# Patient Record
Sex: FEMALE | Race: WHITE | Employment: OTHER | URBAN - METROPOLITAN AREA
[De-identification: names, ages, dates, MRNs, and addresses within clinical notes are randomized per-mention and may not be internally consistent; named-entity substitution may affect disease eponyms.]

---

## 2017-01-23 DIAGNOSIS — I10 BENIGN ESSENTIAL HYPERTENSION: Primary | ICD-10-CM

## 2017-01-24 RX ORDER — HYDROCHLOROTHIAZIDE 25 MG/1
25 TABLET ORAL DAILY
Qty: 90 TABLET | Refills: 1 | Status: SHIPPED | OUTPATIENT
Start: 2017-01-24 | End: 2017-07-06

## 2017-01-24 NOTE — TELEPHONE ENCOUNTER
Medication refilled per [facility] RN protocol.   Mariluz Brooke RN   [facility]

## 2017-07-06 DIAGNOSIS — I10 BENIGN ESSENTIAL HYPERTENSION: ICD-10-CM

## 2017-07-06 RX ORDER — HYDROCHLOROTHIAZIDE 25 MG/1
25 TABLET ORAL DAILY
Qty: 30 TABLET | Refills: 0 | Status: SHIPPED | OUTPATIENT
Start: 2017-07-06 | End: 2017-07-06

## 2017-07-06 RX ORDER — HYDROCHLOROTHIAZIDE 25 MG/1
TABLET ORAL
Qty: 30 TABLET | Refills: 0 | Status: SHIPPED | OUTPATIENT
Start: 2017-07-06 | End: 2017-08-03

## 2017-07-06 NOTE — TELEPHONE ENCOUNTER
Medication is being filled for 1 time refill only due to:  Patient needs to be seen because it has been more than one year since last visit.   1 month supply sent.     Mariluz Brooke RN   [facility]

## 2017-07-19 ENCOUNTER — DOCUMENTATION ONLY (OUTPATIENT)
Dept: LAB | Facility: CLINIC | Age: 82
End: 2017-07-19

## 2017-07-19 DIAGNOSIS — E78.5 HYPERLIPIDEMIA LDL GOAL <130: ICD-10-CM

## 2017-07-19 DIAGNOSIS — I10 BENIGN ESSENTIAL HYPERTENSION: Primary | ICD-10-CM

## 2017-07-19 NOTE — PROGRESS NOTES
This patient has a lab only appointment on 7/25/2017 but does not have future orders. Please review, associate diagnosis and sign pending lab orders for the upcoming appointment.  She has an appointment with Emery Kuhn on 8/3/2017.    Thank you,    [facility]

## 2017-07-25 DIAGNOSIS — E78.5 HYPERLIPIDEMIA LDL GOAL <130: ICD-10-CM

## 2017-07-25 DIAGNOSIS — I10 BENIGN ESSENTIAL HYPERTENSION: ICD-10-CM

## 2017-07-25 LAB
ANION GAP SERPL CALCULATED.3IONS-SCNC: 7 MMOL/L (ref 3–14)
BUN SERPL-MCNC: 21 MG/DL (ref 7–30)
CALCIUM SERPL-MCNC: 8.8 MG/DL (ref 8.5–10.1)
CHLORIDE SERPL-SCNC: 103 MMOL/L (ref 94–109)
CHOLEST SERPL-MCNC: 225 MG/DL
CO2 SERPL-SCNC: 29 MMOL/L (ref 20–32)
CREAT SERPL-MCNC: 0.84 MG/DL (ref 0.52–1.04)
GFR SERPL CREATININE-BSD FRML MDRD: 65 ML/MIN/1.7M2
GLUCOSE SERPL-MCNC: 128 MG/DL (ref 70–99)
HDLC SERPL-MCNC: 55 MG/DL
LDLC SERPL CALC-MCNC: 143 MG/DL
NONHDLC SERPL-MCNC: 170 MG/DL
POTASSIUM SERPL-SCNC: 3.6 MMOL/L (ref 3.4–5.3)
SODIUM SERPL-SCNC: 139 MMOL/L (ref 133–144)
TRIGL SERPL-MCNC: 136 MG/DL

## 2017-07-25 PROCEDURE — 80061 LIPID PANEL: CPT | Performed by: PHYSICIAN ASSISTANT

## 2017-07-25 PROCEDURE — 80048 BASIC METABOLIC PNL TOTAL CA: CPT | Performed by: PHYSICIAN ASSISTANT

## 2017-07-25 PROCEDURE — 36415 COLL VENOUS BLD VENIPUNCTURE: CPT | Performed by: PHYSICIAN ASSISTANT

## 2017-08-02 NOTE — PROGRESS NOTES
SUBJECTIVE:                                                    Flores Cuello is a 82 year old female who presents to clinic today for the following health issues:    Hyperlipidemia Follow-Up      Rate your low fat/cholesterol diet?: not monitoring fat    Taking statin?  No    Other lipid medications/supplements?:  none    Hypertension Follow-up      Outpatient blood pressures are not being checked.    Low Salt Diet: not monitoring salt          Amount of exercise or physical activity: walking, gardening     Problems taking medications regularly: No    Medication side effects: none  Diet: regular (no restrictions)      Problem list and histories reviewed & adjusted, as indicated.  Additional history: as documented    Patient Active Problem List   Diagnosis     Hypertension goal BP (blood pressure) < 140/90     Impaired fasting glucose     Advance directive discussed with patient     Hyperlipidemia LDL goal <130     High cholesterol     Benign essential hypertension     Past Surgical History:   Procedure Laterality Date      SECTION        SECTION         Social History   Substance Use Topics     Smoking status: Never Smoker     Smokeless tobacco: Never Used     Alcohol use No     No family history on file.      Current Outpatient Prescriptions   Medication Sig Dispense Refill     hydrochlorothiazide (HYDRODIURIL) 25 MG tablet TAKE 1 TABLET BY MOUTH EVERY DAY. 90 tablet 1     fluticasone (FLONASE) 50 MCG/ACT nasal spray Spray 2 sprays into both nostrils daily as needed for rhinitis or allergies 16 g 9     aspirin 81 MG chewable tablet Take 1 tablet (81 mg) by mouth daily 108 tablet 3     VITAMIN E daily       Cholecalciferol (VITAMIN D-3 PO) Take by mouth daily       COD LIVER OIL PO Take  by mouth.       Multiple Vitamin (DAILY MULTIVITAMIN PO) Take  by mouth daily.       [DISCONTINUED] hydrochlorothiazide (HYDRODIURIL) 25 MG tablet TAKE 1 TABLET BY MOUTH EVERY DAY. DUE FOR APPOINTMENT 30  "tablet 0     albuterol (PROAIR HFA, PROVENTIL HFA, VENTOLIN HFA) 108 (90 BASE) MCG/ACT inhaler Inhale 2 puffs into the lungs every 6 hours as needed for shortness of breath / dyspnea or wheezing (Patient not taking: Reported on 8/3/2017) 1 Inhaler 1     Allergies   Allergen Reactions     Antihistamine [Altaryl]      Palpitations, \"funny in the head/foggy\"     Lisinopril      Patient states \"felt shaky\"     Simvastatin      \"felt shaky\"     Amoxicillin Rash     Penicillins Rash     Recent Labs   Lab Test  07/25/17   0920  06/28/16   1020   07/08/15   0932  07/02/14   0923   09/21/12   0916  08/22/12   0755   06/27/12   0823   A1C   --    --    --    --   6.0   --    --   5.8   --    --    LDL  143*  117*   --   128   --    < >  119  137*   --   141*   HDL  55  55   --   47*   --    < >  48*  48*   --   53   TRIG  136  154*   --   160*   --    < >  157*  146   --   115   ALT   --    --    --    --    --    --   19  21   --   15   CR  0.84  0.77   < >  0.83   --    < >  0.72  0.76   < >  0.64   GFRESTIMATED  65  72   < >  66   --    < >  79  74   < >  90   GFRESTBLACK  79  87   < >  80   --    < >  >90  89   < >  >90   POTASSIUM  3.6  3.6   < >  3.9   --    < >  3.7  3.7   < >  4.0   TSH   --   2.34   --    --    --    --    --    --    --   2.07    < > = values in this interval not displayed.      BP Readings from Last 3 Encounters:   08/03/17 138/76   06/28/16 136/80   04/25/16 130/80    Wt Readings from Last 3 Encounters:   08/03/17 194 lb (88 kg)   06/28/16 191 lb (86.6 kg)   04/25/16 197 lb (89.4 kg)                  Labs reviewed in EPIC          ROS:  Constitutional, HEENT, cardiovascular, pulmonary, gi and gu systems are negative, except as otherwise noted.      OBJECTIVE:   /76  Pulse 109  Wt 194 lb (88 kg)  SpO2 96%  Breastfeeding? No  BMI 45.92 kg/m2  Body mass index is 45.92 kg/(m^2).  GENERAL: healthy, alert and no distress  Heart: S1 and S2 normal, no murmurs, clicks, gallops or rubs. Regular " rate and rhythm.  Chest: Clear; no wheezes or rales.  The abdomen is soft without tenderness, guarding, mass, rebound or organomegaly. Bowel sounds are normal.        Diagnostic Test Results:  Results for orders placed or performed in visit on 07/25/17   Basic metabolic panel  (Ca, Cl, CO2, Creat, Gluc, K, Na, BUN)   Result Value Ref Range    Sodium 139 133 - 144 mmol/L    Potassium 3.6 3.4 - 5.3 mmol/L    Chloride 103 94 - 109 mmol/L    Carbon Dioxide 29 20 - 32 mmol/L    Anion Gap 7 3 - 14 mmol/L    Glucose 128 (H) 70 - 99 mg/dL    Urea Nitrogen 21 7 - 30 mg/dL    Creatinine 0.84 0.52 - 1.04 mg/dL    GFR Estimate 65 >60 mL/min/1.7m2    GFR Estimate If Black 79 >60 mL/min/1.7m2    Calcium 8.8 8.5 - 10.1 mg/dL   **Lipid panel reflex to direct LDL FUTURE anytime   Result Value Ref Range    Cholesterol 225 (H) <200 mg/dL    Triglycerides 136 <150 mg/dL    HDL Cholesterol 55 >49 mg/dL    LDL Cholesterol Calculated 143 (H) <100 mg/dL    Non HDL Cholesterol 170 (H) <130 mg/dL       ASSESSMENT/PLAN:         ICD-10-CM    1. Benign essential hypertension I10 hydrochlorothiazide (HYDRODIURIL) 25 MG tablet     Basic metabolic panel   2. High cholesterol E78.00    3. Impaired fasting glucose R73.01 Hemoglobin A1c   4. Asymptomatic postmenopausal status Z78.0 DEXA HIP/PELVIS/SPINE - Future   Recheck labs in 3 months. Work on Healthy diet and exercise. Getting heart rate elevated for 30 mins most days of week.  meds refilled.  If stable recheck blood pressure in 6 months.     Emery Kuhn PA-C  [facility]

## 2017-08-03 ENCOUNTER — OFFICE VISIT (OUTPATIENT)
Dept: FAMILY MEDICINE | Facility: CLINIC | Age: 82
End: 2017-08-03
Payer: COMMERCIAL

## 2017-08-03 VITALS
SYSTOLIC BLOOD PRESSURE: 138 MMHG | BODY MASS INDEX: 45.92 KG/M2 | DIASTOLIC BLOOD PRESSURE: 76 MMHG | HEART RATE: 109 BPM | OXYGEN SATURATION: 96 % | WEIGHT: 194 LBS

## 2017-08-03 DIAGNOSIS — I10 BENIGN ESSENTIAL HYPERTENSION: Primary | ICD-10-CM

## 2017-08-03 DIAGNOSIS — Z78.0 ASYMPTOMATIC POSTMENOPAUSAL STATUS: ICD-10-CM

## 2017-08-03 DIAGNOSIS — R73.01 IMPAIRED FASTING GLUCOSE: ICD-10-CM

## 2017-08-03 DIAGNOSIS — E78.00 HIGH CHOLESTEROL: ICD-10-CM

## 2017-08-03 PROCEDURE — 99213 OFFICE O/P EST LOW 20 MIN: CPT | Performed by: PHYSICIAN ASSISTANT

## 2017-08-03 RX ORDER — HYDROCHLOROTHIAZIDE 25 MG/1
TABLET ORAL
Qty: 90 TABLET | Refills: 1 | Status: SHIPPED | OUTPATIENT
Start: 2017-08-03 | End: 2018-03-14

## 2017-08-03 NOTE — MR AVS SNAPSHOT
After Visit Summary   8/3/2017    Flores Cuello    MRN: [id number]           Patient Information     Date Of Birth          12/10/1934        Visit Information        Provider Department      8/3/2017 9:20 AM Emery Kuhn PA-C [facility]        Today's Diagnoses     Benign essential hypertension    -  1    High cholesterol        Impaired fasting glucose        Asymptomatic postmenopausal status           Follow-ups after your visit        Future tests that were ordered for you today     Open Future Orders        Priority Expected Expires Ordered    DEXA HIP/PELVIS/SPINE - Future Routine  8/2/2018 8/3/2017    Basic metabolic panel Routine  8/3/2018 8/3/2017    Hemoglobin A1c Routine  2/2/2018 8/3/2017            Who to contact     If you have questions or need follow up information about today's clinic visit or your schedule please contact [facility] directly at [phone].  Normal or non-critical lab and imaging results will be communicated to you by MyChart, letter or phone within 4 business days after the clinic has received the results. If you do not hear from us within 7 days, please contact the clinic through [employer] or phone. If you have a critical or abnormal lab result, we will notify you by phone as soon as possible.  Submit refill requests through [employer] or call your pharmacy and they will forward the refill request to us. Please allow 3 business days for your refill to be completed.          Additional Information About Your Visit        MyChart Information     [employer] gives you secure access to your electronic health record. If you see a primary care provider, you can also send messages to your care team and make appointments. If you have questions, please call your primary care clinic.  If you do not have a primary care provider, please call [phone] and they will assist you.        Care EveryWhere ID     This is your Care EveryWhere ID.  This could be used by other organizations to access your [facility] medical records  [id number]        Your Vitals Were     Pulse Pulse Oximetry Breastfeeding? BMI (Body Mass Index)          109 96% No 45.92 kg/m2         Blood Pressure from Last 3 Encounters:   08/03/17 138/76   06/28/16 136/80   04/25/16 130/80    Weight from Last 3 Encounters:   08/03/17 194 lb (88 kg)   06/28/16 191 lb (86.6 kg)   04/25/16 197 lb (89.4 kg)                 Today's Medication Changes          These changes are accurate as of: 8/3/17  9:29 AM.  If you have any questions, ask your nurse or doctor.               These medicines have changed or have updated prescriptions.        Dose/Directions    hydrochlorothiazide 25 MG tablet   Commonly known as:  HYDRODIURIL   This may have changed:  See the new instructions.   Used for:  Benign essential hypertension   Changed by:  Emery Kuhn PA-C        TAKE 1 TABLET BY MOUTH EVERY DAY.   Quantity:  90 tablet   Refills:  1            Where to get your medicines      These medications were sent to [facility] [address] [place] [address] AT [facility] & [address] [address]     Phone:  [phone]     hydrochlorothiazide 25 MG tablet                Primary Care Provider Office Phone # Fax #    Emery Kuhn PA-C [phone]       [facility] [address] [facility] [address]        Equal Access to Services     VARGHESE GARCIA AH: Hadii aad ku hadasho Soomaali, waaxda luqadaha, qaybta kaalmada adeegyada, waxay nu diaz. So [facility] [phone].    ATENCIÓN: Si habla español, tiene a white disposición servicios gratuitos de asistencia lingüística. Llame al [phone].    We comply with applicable federal civil rights laws and [place] laws. We do not discriminate on the basis of race, color, national origin, age, disability sex, sexual orientation or gender  identity.            Thank you!     Thank you for choosing [facility]  for your care. Our goal is always to provide you with excellent care. Hearing back from our patients is one way we can continue to improve our services. Please take a few minutes to complete the written survey that you may receive in the mail after your visit with us. Thank you!             Your Updated Medication List - Protect others around you: Learn how to safely use, store and throw away your medicines at www.disposemymeds.org.          This list is accurate as of: 8/3/17  9:29 AM.  Always use your most recent med list.                   Brand Name Dispense Instructions for use Diagnosis    albuterol 108 (90 BASE) MCG/ACT Inhaler    PROAIR HFA/PROVENTIL HFA/VENTOLIN HFA    1 Inhaler    Inhale 2 puffs into the lungs every 6 hours as needed for shortness of breath / dyspnea or wheezing    Wheezing       aspirin 81 MG chewable tablet     108 tablet    Take 1 tablet (81 mg) by mouth daily    Hypertension goal BP (blood pressure) < 140/90       COD LIVER OIL PO      Take  by mouth.        DAILY MULTIVITAMIN PO      Take  by mouth daily.        fluticasone 50 MCG/ACT spray    FLONASE    16 g    Spray 2 sprays into both nostrils daily as needed for rhinitis or allergies    Seasonal allergic rhinitis       hydrochlorothiazide 25 MG tablet    HYDRODIURIL    90 tablet    TAKE 1 TABLET BY MOUTH EVERY DAY.    Benign essential hypertension       VITAMIN D-3 PO      Take by mouth daily        VITAMIN E      daily

## 2017-08-03 NOTE — NURSING NOTE
"Chief Complaint   Patient presents with     Hypertension     Lipids       Initial /76  Pulse 109  Wt 194 lb (88 kg)  SpO2 96%  Breastfeeding? No  BMI 45.92 kg/m2 Estimated body mass index is 45.92 kg/(m^2) as calculated from the following:    Height as of 4/25/16: 4' 6.5\" (1.384 m).    Weight as of this encounter: 194 lb (88 kg).  Medication Reconciliation: complete  Nidhi Schuster CMA    "

## 2018-02-24 DIAGNOSIS — I10 BENIGN ESSENTIAL HYPERTENSION: ICD-10-CM

## 2018-02-26 DIAGNOSIS — I10 BENIGN ESSENTIAL HYPERTENSION: ICD-10-CM

## 2018-02-26 RX ORDER — HYDROCHLOROTHIAZIDE 25 MG/1
25 TABLET ORAL DAILY
Qty: 30 TABLET | Refills: 5 | Status: SHIPPED | OUTPATIENT
Start: 2018-02-26 | End: 2018-02-26

## 2018-02-26 NOTE — LETTER
March 1, 2018    Flores Cuello  [address] [place] [address] [place] [address]    Dear Flores,       We recently received a refill request for hydrochlorothiazide (HYDRODIURIL) 25 MG tablet.  We have refilled this for a one time 30 day supply only because you are due for a:    Blood Pressure office visit      Please call at your earliest convenience so that there will not be a delay with your future refills.          Thank you,   Your [facility] Team/ks  [phone]

## 2018-02-27 ENCOUNTER — TELEPHONE (OUTPATIENT)
Dept: FAMILY MEDICINE | Facility: CLINIC | Age: 83
End: 2018-02-27

## 2018-02-27 NOTE — TELEPHONE ENCOUNTER
Patient states she is supposed to come in for labs 3 months after her last visit with you.  Please enter orders and call her to schedule an appointment after 4:00 pm.    Thank you.

## 2018-02-28 DIAGNOSIS — I10 BENIGN ESSENTIAL HYPERTENSION: ICD-10-CM

## 2018-02-28 DIAGNOSIS — R73.01 IMPAIRED FASTING GLUCOSE: ICD-10-CM

## 2018-02-28 LAB
ANION GAP SERPL CALCULATED.3IONS-SCNC: 9 MMOL/L (ref 3–14)
BUN SERPL-MCNC: 16 MG/DL (ref 7–30)
CALCIUM SERPL-MCNC: 8.7 MG/DL (ref 8.5–10.1)
CHLORIDE SERPL-SCNC: 101 MMOL/L (ref 94–109)
CO2 SERPL-SCNC: 27 MMOL/L (ref 20–32)
CREAT SERPL-MCNC: 0.83 MG/DL (ref 0.52–1.04)
GFR SERPL CREATININE-BSD FRML MDRD: 66 ML/MIN/1.7M2
GLUCOSE SERPL-MCNC: 121 MG/DL (ref 70–99)
HBA1C MFR BLD: 6.3 % (ref 4.3–6)
POTASSIUM SERPL-SCNC: 3.7 MMOL/L (ref 3.4–5.3)
SODIUM SERPL-SCNC: 137 MMOL/L (ref 133–144)

## 2018-02-28 PROCEDURE — 80048 BASIC METABOLIC PNL TOTAL CA: CPT | Performed by: PHYSICIAN ASSISTANT

## 2018-02-28 PROCEDURE — 83036 HEMOGLOBIN GLYCOSYLATED A1C: CPT | Performed by: PHYSICIAN ASSISTANT

## 2018-02-28 PROCEDURE — 36415 COLL VENOUS BLD VENIPUNCTURE: CPT | Performed by: PHYSICIAN ASSISTANT

## 2018-02-28 RX ORDER — HYDROCHLOROTHIAZIDE 25 MG/1
TABLET ORAL
Qty: 30 TABLET | Refills: 0 | Status: SHIPPED | OUTPATIENT
Start: 2018-02-28 | End: 2018-03-14

## 2018-03-09 NOTE — PROGRESS NOTES
SUBJECTIVE:                                                    Flores Cuello is a 83 year old female who presents to clinic today for the following health issues:    Hypertension Follow-up      Outpatient blood pressures are not being checked.    Low Salt Diet: low salt      Amount of exercise or physical activity: stretching in morning, walking    Problems taking medications regularly: No    Medication side effects: none    Diet: low salt, no soda  No chest pain or short of breath, no headache, dizziness.   defers taking cholesterol medications.    Problem list and histories reviewed & adjusted, as indicated.  Additional history: as documented    Patient Active Problem List   Diagnosis     Hypertension goal BP (blood pressure) < 140/90     Impaired fasting glucose     Advance directive discussed with patient     Hyperlipidemia LDL goal <130     High cholesterol     Benign essential hypertension     Past Surgical History:   Procedure Laterality Date      SECTION        SECTION         Social History   Substance Use Topics     Smoking status: Never Smoker     Smokeless tobacco: Never Used     Alcohol use No     History reviewed. No pertinent family history.      Current Outpatient Prescriptions   Medication Sig Dispense Refill     metoprolol succinate (TOPROL-XL) 25 MG 24 hr tablet Take 1 tablet (25 mg) by mouth daily 30 tablet 1     hydrochlorothiazide (HYDRODIURIL) 25 MG tablet TAKE 1 TABLET(25 MG) BY MOUTH DAILY 30 tablet 0     fluticasone (FLONASE) 50 MCG/ACT nasal spray Spray 2 sprays into both nostrils daily as needed for rhinitis or allergies 16 g 9     albuterol (PROAIR HFA, PROVENTIL HFA, VENTOLIN HFA) 108 (90 BASE) MCG/ACT inhaler Inhale 2 puffs into the lungs every 6 hours as needed for shortness of breath / dyspnea or wheezing 1 Inhaler 1     aspirin 81 MG chewable tablet Take 1 tablet (81 mg) by mouth daily 108 tablet 3     VITAMIN E daily       Cholecalciferol (VITAMIN D-3  "PO) Take by mouth daily       COD LIVER OIL PO Take  by mouth.       Multiple Vitamin (DAILY MULTIVITAMIN PO) Take  by mouth daily.       [DISCONTINUED] hydrochlorothiazide (HYDRODIURIL) 25 MG tablet TAKE 1 TABLET(25 MG) BY MOUTH DAILY 30 tablet 0     [DISCONTINUED] hydrochlorothiazide (HYDRODIURIL) 25 MG tablet TAKE 1 TABLET BY MOUTH EVERY DAY. 90 tablet 1     Allergies   Allergen Reactions     Antihistamine [Altaryl]      Palpitations, \"funny in the head/foggy\"     Lisinopril      Patient states \"felt shaky\"     Simvastatin      \"felt shaky\"     Amoxicillin Rash     Penicillins Rash     BP Readings from Last 3 Encounters:   03/14/18 171/85   08/03/17 138/76   06/28/16 136/80    Wt Readings from Last 3 Encounters:   03/14/18 185 lb (83.9 kg)   08/03/17 194 lb (88 kg)   06/28/16 191 lb (86.6 kg)                  Labs reviewed in EPIC    ROS:  Constitutional, HEENT, cardiovascular, pulmonary, gi and gu systems are negative, except as otherwise noted.    OBJECTIVE:     /85  Pulse 89  Temp 97.5  F (36.4  C) (Oral)  Resp 18  Ht 4' 6.5\" (1.384 m)  Wt 185 lb (83.9 kg)  SpO2 96%  BMI 43.79 kg/m2  Body mass index is 43.79 kg/(m^2).  GENERAL: healthy, alert and no distress  RESP: lungs clear to auscultation - no rales, rhonchi or wheezes  CV: regular rate and rhythm, normal S1 S2, no S3 or S4, no murmur, click or rub, no peripheral edema and peripheral pulses strong  MS: no gross musculoskeletal defects noted, no edema    Diagnostic Test Results:  none     ASSESSMENT/PLAN:         ICD-10-CM    1. Benign essential hypertension I10 metoprolol succinate (TOPROL-XL) 25 MG 24 hr tablet     hydrochlorothiazide (HYDRODIURIL) 25 MG tablet   add metoprolol. warning signs discussed. side effects discussed  Recheck blood pressure in 2wks.   Work on Healthy diet and exercise. Getting heart rate elevated for 30 mins most days of week.     Emery Kuhn PA-C  [facility]    "

## 2018-03-14 ENCOUNTER — OFFICE VISIT (OUTPATIENT)
Dept: FAMILY MEDICINE | Facility: CLINIC | Age: 83
End: 2018-03-14
Payer: COMMERCIAL

## 2018-03-14 VITALS
TEMPERATURE: 97.5 F | OXYGEN SATURATION: 96 % | RESPIRATION RATE: 18 BRPM | HEART RATE: 89 BPM | WEIGHT: 185 LBS | BODY MASS INDEX: 42.81 KG/M2 | DIASTOLIC BLOOD PRESSURE: 85 MMHG | HEIGHT: 55 IN | SYSTOLIC BLOOD PRESSURE: 171 MMHG

## 2018-03-14 DIAGNOSIS — I10 BENIGN ESSENTIAL HYPERTENSION: ICD-10-CM

## 2018-03-14 DIAGNOSIS — I10 BENIGN ESSENTIAL HYPERTENSION: Primary | ICD-10-CM

## 2018-03-14 PROBLEM — Z71.89 ADVANCE DIRECTIVE DISCUSSED WITH PATIENT: Status: ACTIVE | Noted: 2018-03-14

## 2018-03-14 PROCEDURE — 99213 OFFICE O/P EST LOW 20 MIN: CPT | Performed by: PHYSICIAN ASSISTANT

## 2018-03-14 RX ORDER — METOPROLOL SUCCINATE 25 MG/1
TABLET, EXTENDED RELEASE ORAL
Qty: 90 TABLET | Refills: 1 | OUTPATIENT
Start: 2018-03-14

## 2018-03-14 RX ORDER — HYDROCHLOROTHIAZIDE 25 MG/1
TABLET ORAL
Qty: 30 TABLET | Refills: 0 | Status: SHIPPED | OUTPATIENT
Start: 2018-03-14 | End: 2018-03-30

## 2018-03-14 RX ORDER — METOPROLOL SUCCINATE 25 MG/1
25 TABLET, EXTENDED RELEASE ORAL DAILY
Qty: 30 TABLET | Refills: 1 | Status: SHIPPED | OUTPATIENT
Start: 2018-03-14 | End: 2018-03-30 | Stop reason: DRUGHIGH

## 2018-03-14 ASSESSMENT — PAIN SCALES - GENERAL: PAINLEVEL: MILD PAIN (2)

## 2018-03-14 NOTE — NURSING NOTE
"Chief Complaint   Patient presents with     Hypertension       Initial /88  Pulse 89  Temp 97.5  F (36.4  C) (Oral)  Resp 18  Ht 4' 6.5\" (1.384 m)  Wt 185 lb (83.9 kg)  SpO2 96%  BMI 43.79 kg/m2 Estimated body mass index is 43.79 kg/(m^2) as calculated from the following:    Height as of this encounter: 4' 6.5\" (1.384 m).    Weight as of this encounter: 185 lb (83.9 kg).  Medication Reconciliation: complete  Nidhi Schuster CMA    "

## 2018-03-14 NOTE — MR AVS SNAPSHOT
After Visit Summary   3/14/2018    Flores Cuello    MRN: [id number]           Patient Information     Date Of Birth          12/10/1934        Visit Information        Provider Department      3/14/2018 10:40 AM Emery Kuhn PA-C [facility]        Today's Diagnoses     Benign essential hypertension    -  1       Follow-ups after your visit        Follow-up notes from your care team     Return in about 2 weeks (around 3/28/2018) for BP Recheck.      Your next 10 appointments already scheduled     Mar 14, 2018 10:40 AM CDT   Office Visit with Emery Kuhn PA-C   [facility] ([facility]    [address] [facility] [address] [phone]           Bring a current list of meds and any records pertaining to this visit. For Physicals, please bring immunization records and any forms needing to be filled out. Please arrive 10 minutes early to complete paperwork.              Who to contact     If you have questions or need follow up information about today's clinic visit or your schedule please contact [facility] directly at [phone].  Normal or non-critical lab and imaging results will be communicated to you by MES[employer]art, letter or phone within 4 business days after the clinic has received the results. If you do not hear from us within 7 days, please contact the clinic through [employer] or phone. If you have a critical or abnormal lab result, we will notify you by phone as soon as possible.  Submit refill requests through [employer] or call your pharmacy and they will forward the refill request to us. Please allow 3 business days for your refill to be completed.          Additional Information About Your Visit        MyChart Information     [employer] gives you secure access to your electronic health record. If you see a primary care provider, you can also send messages to your care team and make appointments. If you have  "questions, please call your primary care clinic.  If you do not have a primary care provider, please call [phone] and they will assist you.        Care EveryWhere ID     This is your Care EveryWhere ID. This could be used by other organizations to access your [facility] medical records  [id number]        Your Vitals Were     Pulse Temperature Respirations Height Pulse Oximetry BMI (Body Mass Index)    89 97.5  F (36.4  C) (Oral) 18 4' 6.5\" (1.384 m) 96% 43.79 kg/m2       Blood Pressure from Last 3 Encounters:   03/14/18 171/85   08/03/17 138/76   06/28/16 136/80    Weight from Last 3 Encounters:   03/14/18 185 lb (83.9 kg)   08/03/17 194 lb (88 kg)   06/28/16 191 lb (86.6 kg)              Today, you had the following     No orders found for display         Today's Medication Changes          These changes are accurate as of 3/14/18 10:34 AM.  If you have any questions, ask your nurse or doctor.               Start taking these medicines.        Dose/Directions    metoprolol succinate 25 MG 24 hr tablet   Commonly known as:  TOPROL-XL   Used for:  Benign essential hypertension   Started by:  Emery Kuhn PA-C        Dose:  25 mg   Take 1 tablet (25 mg) by mouth daily   Quantity:  30 tablet   Refills:  1         These medicines have changed or have updated prescriptions.        Dose/Directions    hydrochlorothiazide 25 MG tablet   Commonly known as:  HYDRODIURIL   This may have changed:  See the new instructions.   Used for:  Benign essential hypertension   Changed by:  Emery Kuhn PA-C        TAKE 1 TABLET(25 MG) BY MOUTH DAILY   Quantity:  30 tablet   Refills:  0            Where to get your medicines      [id number] [place] - [address] AT [facility] & [place] [id number] [place] [address]     Phone:  [phone]     hydrochlorothiazide 25 MG tablet    metoprolol succinate 25 MG 24 hr tablet          "       Primary Care Provider Office Phone # Fax #    Emery Kuhn PA-C [phone] [address] [facility] [address]        Equal Access to Services     VARGHESE GARCIA : Hadii nichol ku ismaelo Soelsaali, waaxda luqadaha, qaybta kaalmada adeyoselyn, ned holloway laDomingachato diaz. So [facility] [phone].    ATENCIÓN: Si habla español, tiene a white disposición servicios gratuitos de asistencia lingüística. Llame al [phone].    We comply with applicable federal civil rights laws and [place] laws. We do not discriminate on the basis of race, color, national origin, age, disability, sex, sexual orientation, or gender identity.            Thank you!     Thank you for choosing [facility]  for your care. Our goal is always to provide you with excellent care. Hearing back from our patients is one way we can continue to improve our services. Please take a few minutes to complete the written survey that you may receive in the mail after your visit with us. Thank you!             Your Updated Medication List - Protect others around you: Learn how to safely use, store and throw away your medicines at www.disposemymeds.org.          This list is accurate as of 3/14/18 10:34 AM.  Always use your most recent med list.                   Brand Name Dispense Instructions for use Diagnosis    albuterol 108 (90 BASE) MCG/ACT Inhaler    PROAIR HFA/PROVENTIL HFA/VENTOLIN HFA    1 Inhaler    Inhale 2 puffs into the lungs every 6 hours as needed for shortness of breath / dyspnea or wheezing    Wheezing       aspirin 81 MG chewable tablet     108 tablet    Take 1 tablet (81 mg) by mouth daily    Hypertension goal BP (blood pressure) < 140/90       COD LIVER OIL PO      Take  by mouth.        DAILY MULTIVITAMIN PO      Take  by mouth daily.        fluticasone 50 MCG/ACT spray    FLONASE    16 g    Spray 2 sprays into both nostrils daily as needed for rhinitis or allergies    Seasonal  allergic rhinitis       hydrochlorothiazide 25 MG tablet    HYDRODIURIL    30 tablet    TAKE 1 TABLET(25 MG) BY MOUTH DAILY    Benign essential hypertension       metoprolol succinate 25 MG 24 hr tablet    TOPROL-XL    30 tablet    Take 1 tablet (25 mg) by mouth daily    Benign essential hypertension       VITAMIN D-3 PO      Take by mouth daily        VITAMIN E      daily

## 2018-03-29 DIAGNOSIS — J30.2 SEASONAL ALLERGIC RHINITIS: ICD-10-CM

## 2018-03-29 RX ORDER — FLUTICASONE PROPIONATE 50 MCG
SPRAY, SUSPENSION (ML) NASAL
Qty: 16 ML | Refills: 2 | Status: SHIPPED | OUTPATIENT
Start: 2018-03-29 | End: 2019-04-02

## 2018-03-29 NOTE — PROGRESS NOTES
SUBJECTIVE:   Flores Cuello is a 83 year old female who presents to clinic today for the following health issues:      Hypertension Follow-up      Outpatient blood pressures are not being checked.    Low Salt Diet: no added salt      Amount of exercise or physical activity: walking, stretching    Problems taking medications regularly: No    Medication side effects: none    Diet: regular (no restrictions)    Problem list and histories reviewed & adjusted, as indicated.  Additional history: as documented    Patient Active Problem List   Diagnosis     Impaired fasting glucose     Advance directive discussed with patient     Hyperlipidemia LDL goal <130     High cholesterol     Benign essential hypertension     Hypertension goal BP (blood pressure) < 150/90     Past Surgical History:   Procedure Laterality Date      SECTION        SECTION         Social History   Substance Use Topics     Smoking status: Never Smoker     Smokeless tobacco: Never Used     Alcohol use No     No family history on file.      Current Outpatient Prescriptions   Medication Sig Dispense Refill     MAGNESIUM OXIDE PO        hydrochlorothiazide (HYDRODIURIL) 25 MG tablet TAKE 1 TABLET(25 MG) BY MOUTH DAILY 30 tablet 0     metoprolol succinate (TOPROL-XL) 50 MG 24 hr tablet Take 1 tablet (50 mg) by mouth daily 30 tablet 1     fluticasone (FLONASE) 50 MCG/ACT spray SHAKE LIQUID AND USE 2 SPRAYS IN EACH NOSTRIL DAILY AS NEEDED FOR RHINITIS OR ALLERGIES 16 mL 2     [DISCONTINUED] metoprolol succinate (TOPROL-XL) 25 MG 24 hr tablet Take 1 tablet (25 mg) by mouth daily 30 tablet 1     [DISCONTINUED] hydrochlorothiazide (HYDRODIURIL) 25 MG tablet TAKE 1 TABLET(25 MG) BY MOUTH DAILY 30 tablet 0     albuterol (PROAIR HFA, PROVENTIL HFA, VENTOLIN HFA) 108 (90 BASE) MCG/ACT inhaler Inhale 2 puffs into the lungs every 6 hours as needed for shortness of breath / dyspnea or wheezing 1 Inhaler 1     aspirin 81 MG chewable tablet  "Take 1 tablet (81 mg) by mouth daily 108 tablet 3     VITAMIN E daily       Cholecalciferol (VITAMIN D-3 PO) Take by mouth daily       COD LIVER OIL PO Take  by mouth.       Multiple Vitamin (DAILY MULTIVITAMIN PO) Take  by mouth daily.       Allergies   Allergen Reactions     Antihistamine [Altaryl]      Palpitations, \"funny in the head/foggy\"     Lisinopril      Patient states \"felt shaky\"     Simvastatin      \"felt shaky\"     Amoxicillin Rash     Penicillins Rash     BP Readings from Last 3 Encounters:   03/30/18 160/80   03/14/18 171/85   08/03/17 138/76    Wt Readings from Last 3 Encounters:   03/30/18 182 lb (82.6 kg)   03/14/18 185 lb (83.9 kg)   08/03/17 194 lb (88 kg)                  Labs reviewed in EPIC    Reviewed and updated as needed this visit by clinical staff  Tobacco  Allergies  Meds  Problems       Reviewed and updated as needed this visit by Provider  Allergies  Meds  Problems         ROS:  Constitutional, HEENT, cardiovascular, pulmonary, gi and gu systems are negative, except as otherwise noted.    OBJECTIVE:     /80  Pulse 85  Temp 97.4  F (36.3  C) (Oral)  Resp 18  Ht 4' 6.5\" (1.384 m)  Wt 182 lb (82.6 kg)  SpO2 99%  BMI 43.08 kg/m2  Body mass index is 43.08 kg/(m^2).  GENERAL: healthy, alert and no distress  RESP: lungs clear to auscultation - no rales, rhonchi or wheezes  CV: regular rate and rhythm, normal S1 S2, no S3 or S4, no murmur, click or rub, no peripheral edema and peripheral pulses strong  MS: no gross musculoskeletal defects noted, no edema  NEURO: Normal strength and tone, mentation intact and speech normal  PSYCH: mentation appears normal, affect normal/bright    Diagnostic Test Results:  none     ASSESSMENT/PLAN:       ICD-10-CM    1. Benign essential hypertension I10 hydrochlorothiazide (HYDRODIURIL) 25 MG tablet     metoprolol succinate (TOPROL-XL) 50 MG 24 hr tablet   2. Hypertension goal BP (blood pressure) < 150/90 I10    increase metoprolol to 50mg " a day.  Recheck 2 wks with pharmacy.  If stable then 6 months.     Emery Kuhn PA-C  [facility]

## 2018-03-30 ENCOUNTER — OFFICE VISIT (OUTPATIENT)
Dept: FAMILY MEDICINE | Facility: CLINIC | Age: 83
End: 2018-03-30
Payer: COMMERCIAL

## 2018-03-30 VITALS
BODY MASS INDEX: 42.12 KG/M2 | DIASTOLIC BLOOD PRESSURE: 80 MMHG | HEIGHT: 55 IN | OXYGEN SATURATION: 99 % | TEMPERATURE: 97.4 F | WEIGHT: 182 LBS | SYSTOLIC BLOOD PRESSURE: 160 MMHG | HEART RATE: 85 BPM | RESPIRATION RATE: 18 BRPM

## 2018-03-30 DIAGNOSIS — I10 BENIGN ESSENTIAL HYPERTENSION: ICD-10-CM

## 2018-03-30 DIAGNOSIS — I10 HYPERTENSION GOAL BP (BLOOD PRESSURE) < 150/90: ICD-10-CM

## 2018-03-30 DIAGNOSIS — I10 BENIGN ESSENTIAL HYPERTENSION: Primary | ICD-10-CM

## 2018-03-30 PROCEDURE — 99213 OFFICE O/P EST LOW 20 MIN: CPT | Performed by: PHYSICIAN ASSISTANT

## 2018-03-30 RX ORDER — METOPROLOL SUCCINATE 50 MG/1
TABLET, EXTENDED RELEASE ORAL
OUTPATIENT
Start: 2018-03-30

## 2018-03-30 RX ORDER — METOPROLOL SUCCINATE 50 MG/1
50 TABLET, EXTENDED RELEASE ORAL DAILY
Qty: 30 TABLET | Refills: 1 | Status: SHIPPED | OUTPATIENT
Start: 2018-03-30 | End: 2018-05-31

## 2018-03-30 RX ORDER — HYDROCHLOROTHIAZIDE 25 MG/1
TABLET ORAL
Qty: 30 TABLET | Refills: 0 | Status: SHIPPED | OUTPATIENT
Start: 2018-03-30 | End: 2018-06-28

## 2018-03-30 ASSESSMENT — PAIN SCALES - GENERAL: PAINLEVEL: NO PAIN (0)

## 2018-03-30 NOTE — MR AVS SNAPSHOT
After Visit Summary   3/30/2018    Flores Cuello    MRN: [id number]           Patient Information     Date Of Birth          12/10/1934        Visit Information        Provider Department      3/30/2018 10:00 AM Emery Kuhn PA-C [facility]        Today's Diagnoses     Asymptomatic postmenopausal status        Benign essential hypertension           Follow-ups after your visit        Your next 10 appointments already scheduled     Mar 30, 2018 10:00 AM CDT   Office Visit with Emery Kuhn PA-C   [facility]    [address] [facility] [address] [phone]           Bring a current list of meds and any records pertaining to this visit. For Physicals, please bring immunization records and any forms needing to be filled out. Please arrive 10 minutes early to complete paperwork.              Who to contact     If you have questions or need follow up information about today's clinic visit or your schedule please contact [facility] directly at [phone].  Normal or non-critical lab and imaging results will be communicated to you by Aerobhart, letter or phone within 4 business days after the clinic has received the results. If you do not hear from us within 7 days, please contact the clinic through Toutt or phone. If you have a critical or abnormal lab result, we will notify you by phone as soon as possible.  Submit refill requests through Metaset or call your pharmacy and they will forward the refill request to us. Please allow 3 business days for your refill to be completed.          Additional Information About Your Visit        Aerobhart Information     Metaset gives you secure access to your electronic health record. If you see a primary care provider, you can also send messages to your care team and make appointments. If you have questions, please call your primary care clinic.  If you do not have a  "primary care provider, please call [phone] and they will assist you.        Care EveryWhere ID     This is your Care EveryWhere ID. This could be used by other organizations to access your [place] medical records  [id number]        Your Vitals Were     Pulse Temperature Respirations Height Pulse Oximetry BMI (Body Mass Index)    85 97.4  F (36.3  C) (Oral) 18 4' 6.5\" (1.384 m) 99% 43.08 kg/m2       Blood Pressure from Last 3 Encounters:   03/30/18 160/80   03/14/18 171/85   08/03/17 138/76    Weight from Last 3 Encounters:   03/30/18 182 lb (82.6 kg)   03/14/18 185 lb (83.9 kg)   08/03/17 194 lb (88 kg)              Today, you had the following     No orders found for display         Today's Medication Changes          These changes are accurate as of 3/30/18  9:58 AM.  If you have any questions, ask your nurse or doctor.               These medicines have changed or have updated prescriptions.        Dose/Directions    * metoprolol succinate 25 MG 24 hr tablet   Commonly known as:  TOPROL-XL   This may have changed:  Another medication with the same name was added. Make sure you understand how and when to take each.   Used for:  Benign essential hypertension   Changed by:  Emery Kuhn PA-C        Dose:  25 mg   Take 1 tablet (25 mg) by mouth daily   Quantity:  30 tablet   Refills:  1       * metoprolol succinate 50 MG 24 hr tablet   Commonly known as:  TOPROL-XL   This may have changed:  You were already taking a medication with the same name, and this prescription was added. Make sure you understand how and when to take each.   Used for:  Benign essential hypertension   Changed by:  Emery Kuhn PA-C        Dose:  50 mg   Take 1 tablet (50 mg) by mouth daily   Quantity:  30 tablet   Refills:  1       * Notice:  This list has 2 medication(s) that are the same as other medications prescribed for you. Read the directions carefully, and ask your doctor or other care provider to review them " with you.         Where to get your medicines      These medications were sent to [facility] [place] - [address] AT [facility] [address]  [address] [facility], [place] [address]     Phone:  [phone]     hydrochlorothiazide 25 MG tablet    metoprolol succinate 50 MG 24 hr tablet                Primary Care Provider Office Phone # Fax #    Emery Kuhn PA-C [phone] [address] [facility] [address]        Equal Access to Services     [facility]: Hadii aad ku hadasho Soomaali, waaxda luqadaha, qaybta kaalmada adeegyada, waxgracie hodgsonin haychato nelson . So [facility] [phone].    ATENCIÓN: Si habla español, tiene a white disposición servicios gratuitos de asistencia lingüística. [facility] [phone].    We comply with applicable federal civil rights laws and [place] laws. We do not discriminate on the basis of race, color, national origin, age, disability, sex, sexual orientation, or gender identity.            Thank you!     Thank you for choosing [facility]  for your care. Our goal is always to provide you with excellent care. Hearing back from our patients is one way we can continue to improve our services. Please take a few minutes to complete the written survey that you may receive in the mail after your visit with us. Thank you!             Your Updated Medication List - Protect others around you: Learn how to safely use, store and throw away your medicines at www.disposemymeds.org.          This list is accurate as of 3/30/18  9:58 AM.  Always use your most recent med list.                   Brand Name Dispense Instructions for use Diagnosis    albuterol 108 (90 BASE) MCG/ACT Inhaler    PROAIR HFA/PROVENTIL HFA/VENTOLIN HFA    1 Inhaler    Inhale 2 puffs into the lungs every 6 hours as needed for shortness of breath / dyspnea or wheezing    Wheezing       aspirin 81 MG chewable tablet     108 tablet     Take 1 tablet (81 mg) by mouth daily    Hypertension goal BP (blood pressure) < 140/90       COD LIVER OIL PO      Take  by mouth.        DAILY MULTIVITAMIN PO      Take  by mouth daily.        fluticasone 50 MCG/ACT spray    FLONASE    16 mL    SHAKE LIQUID AND USE 2 SPRAYS IN EACH NOSTRIL DAILY AS NEEDED FOR RHINITIS OR ALLERGIES    Seasonal allergic rhinitis       hydrochlorothiazide 25 MG tablet    HYDRODIURIL    30 tablet    TAKE 1 TABLET(25 MG) BY MOUTH DAILY    Benign essential hypertension       MAGNESIUM OXIDE PO           * metoprolol succinate 25 MG 24 hr tablet    TOPROL-XL    30 tablet    Take 1 tablet (25 mg) by mouth daily    Benign essential hypertension       * metoprolol succinate 50 MG 24 hr tablet    TOPROL-XL    30 tablet    Take 1 tablet (50 mg) by mouth daily    Benign essential hypertension       VITAMIN D-3 PO      Take by mouth daily        VITAMIN E      daily        * Notice:  This list has 2 medication(s) that are the same as other medications prescribed for you. Read the directions carefully, and ask your doctor or other care provider to review them with you.

## 2018-03-30 NOTE — NURSING NOTE
"Chief Complaint   Patient presents with     Hypertension     Health Maintenance     order placed       Initial BP (!) 175/95  Pulse 85  Temp 97.4  F (36.3  C) (Oral)  Resp 18  Ht 4' 6.5\" (1.384 m)  Wt 182 lb (82.6 kg)  SpO2 99%  BMI 43.08 kg/m2 Estimated body mass index is 43.08 kg/(m^2) as calculated from the following:    Height as of this encounter: 4' 6.5\" (1.384 m).    Weight as of this encounter: 182 lb (82.6 kg).  Medication Reconciliation: complete  Nidhi Schuster CMA    "

## 2018-04-15 ENCOUNTER — NURSE TRIAGE (OUTPATIENT)
Dept: NURSING | Facility: CLINIC | Age: 83
End: 2018-04-15

## 2018-04-15 NOTE — TELEPHONE ENCOUNTER
"  Reason for Disposition    [1] SEVERE pain AND [2] not improved 2 hours after pain medicine     \"I have been having \"pressure\" in my cheeks and throat (under the jaw bone) since this morning. I took one Tatum Asprin.It's helping, but this pressure I have never had before. It's not really a headache. It's in my cheeks and my throat, it feels tight. I have a runny nose but clear and nothing major. \" denies other sx. As I was triaging patient she was talking about her high blood pressure. Per epic and last OV with MD on 3/30/18: 1. Benign essential hypertension I10 hydrochlorothiazide (HYDRODIURIL) 25 MG tablet      metoprolol succinate (TOPROL-XL) 50 MG 24 hr tablet  2. Hypertension goal BP (blood pressure) < 150/90 I10    increase metoprolol to 50mg a day.  Recheck 2 wks with pharmacy.  If stable then 6 months.      Emery Kuhn PA-C  [facility]  \"I have only been taking the metoprolol. I haven't been taking the other med and I didn't go in to get my BP checked because I was out of town.\" Denies dizziness, chest pain or other sx. States \"It does feel tighter\" when I take a deep breath in.\" Advised to check BP at pharmacy(per pt request) and if it is high I advised ER. Also advised to follow up with MD in am. Call back if needed.    Additional Information    Negative: Severe difficulty breathing (e.g., struggling for each breath, speaks in single words)    Negative: Sounds like a life-threatening emergency to the triager    Negative: [1] Sinus infection AND [2] taking an antibiotic AND [3] symptoms continue    Negative: [1] Difficulty breathing AND [2] not from stuffy nose (e.g., not relieved by cleaning out the nose)    Negative: [1] SEVERE headache AND [2] fever    Negative: [1] Redness or swelling on the cheek, forehead or around the eye AND [2] fever    Negative: Fever > 104 F (40 C)    Negative: Patient sounds very sick or weak to the triager    Protocols used: SINUS PAIN OR " CONGESTION-ADULT-AH

## 2018-05-11 ENCOUNTER — TRANSFERRED RECORDS (OUTPATIENT)
Dept: HEALTH INFORMATION MANAGEMENT | Facility: CLINIC | Age: 83
End: 2018-05-11

## 2018-05-14 ENCOUNTER — TELEPHONE (OUTPATIENT)
Dept: CARE COORDINATION | Facility: CLINIC | Age: 83
End: 2018-05-14

## 2018-05-14 NOTE — TELEPHONE ENCOUNTER
DC'd from [facility] on 5/11/18 to Home self care   Primary Problem: ED discharge  LACE: 71 High

## 2018-05-15 ENCOUNTER — PATIENT OUTREACH (OUTPATIENT)
Dept: CARE COORDINATION | Facility: CLINIC | Age: 83
End: 2018-05-15

## 2018-05-15 NOTE — LETTER
[place] CARE COORDINATION    May 18, 2018    Flores Cuello  [facility] [place] [address]      Dear Flores,    I am a clinic care coordinator who works with Emery Kuhn PA-C at [facility]. I have been trying to reach you recently to introduce Clinic Care Coordination and to see if there was anything I could assist you with.  I wanted to introduce myself and provide you with my contact information so that you can call me with questions or concerns about your health care. Below is a description of clinic care coordination and how I can further assist you.     The clinic care coordinator is a registered nurse and/or  who understand the health care system. The goal of clinic care coordination is to help you manage your health and improve access to the [facility] system in the most efficient manner. The registered nurse can assist you in meeting your health care goals by providing education, coordinating services, and strengthening the communication among your providers. The  can assist you with financial, behavioral, psychosocial, chemical dependency, counseling, and/or psychiatric resources.    Please feel free to contact me at [phone], with any questions or concerns. We at [facility] are focused on providing you with the highest-quality healthcare experience possible and that all starts with you.     Sincerely,           Mariaelena STILES, RN, PHN  Care Coordination    [facility]  [address] [place] [address]  Office: [phone]  Fax [phone]   [facility]  [address] [place] [address]  Office: [phone] Fax [phone]  [email or web address]   [email or web address]   Connect with [facility] on social media.

## 2018-05-16 NOTE — PROGRESS NOTES
Clinic Care Coordination Contact  [facility]/Voicemail    Received notice pt was see in the ED on 5/11.   Pt was seen for sob.  Pt states she has a history of asthma.  Pt was started on inhalers and prednisone. Advised to follow up for blood pressure.      Clinical Data: Care Coordinator Outreach  Outreach attempted x 1.  Left message on voicemail with call back information and requested return call.  Plan: . Care Coordinator will try to reach patient again in 1-2 business days.    Mariaelena NARVAEZN, RN, PHN  Care Coordination    [address] [place] [address]  Office: [phone]  Fax [phone]   [facility]  [address] [place] [address]  Office: [phone] Fax [phone]  [email or web address]   [email or web address]   Connect with [facility] on social media.

## 2018-05-18 NOTE — PROGRESS NOTES
Clinic Care Coordination Contact  [facility]/Voicemail       Clinical Data: Care Coordinator Outreach  Outreach attempted x 2.  No answer No machine  Plan: Care Coordinator will mail out care coordination introduction letter with care coordinator contact information and explanation of care coordination services. Care Coordinator will try to reach patient again in 3-5 business days.      Mariaelena STILES, RN, PHN  Care Coordination    [facility]  [address] [place] [address]  Office: [phone]  Fax [phone]   [facility]  [address] [place] [address]  Office: [phone] Fax [phone]  [email or web address]   [email or web address]   Connect with [facility] [employer] on social media.

## 2018-05-22 NOTE — PROGRESS NOTES
Clinic Care Coordination Contact  [facility]/Voicemail    Referral Source: IP Report  Clinical Data: Care Coordinator Outreach  Outreach attempted x 3.  Left message on voicemail with call back information and requested return call.  Plan: Care Coordinator will do no further outreaches at this time.      Mariaelena STILES, RN, PHN  Care Coordination    [facility]  [address] [place] [address]  Office: [phone]  Fax [phone]   [facility]  [address] [place] [address]  Office: [phone] Fax [phone]  [email or web address]   [email or web address]   Connect with [facility] on social media.

## 2018-05-31 DIAGNOSIS — I10 BENIGN ESSENTIAL HYPERTENSION: ICD-10-CM

## 2018-05-31 NOTE — LETTER
June 1, 2018    Flores Cuello  [address] [place] [address] [place] [address]    Dear Flores,       We recently received a refill request for metoprolol succinate (TOPROL-XL) 50 MG 24 hr tablet.  We have refilled this for a one time 30 day supply only because you are due for a:    Blood pressure office visit with provider and fasting lab appointment      Please schedule this lab appointment 4-5 days prior to the office visit.     Please call at your earliest convenience so that there will not be a delay with your future refills.          Thank you,   Your [facility] Team/  [phone]

## 2018-06-01 RX ORDER — METOPROLOL SUCCINATE 50 MG/1
TABLET, EXTENDED RELEASE ORAL
Qty: 30 TABLET | Refills: 0 | Status: SHIPPED | OUTPATIENT
Start: 2018-06-01 | End: 2018-06-28

## 2018-06-14 ENCOUNTER — OFFICE VISIT (OUTPATIENT)
Dept: FAMILY MEDICINE | Facility: CLINIC | Age: 83
End: 2018-06-14
Payer: COMMERCIAL

## 2018-06-14 VITALS
HEART RATE: 86 BPM | WEIGHT: 177 LBS | DIASTOLIC BLOOD PRESSURE: 90 MMHG | HEIGHT: 55 IN | RESPIRATION RATE: 20 BRPM | SYSTOLIC BLOOD PRESSURE: 164 MMHG | OXYGEN SATURATION: 97 % | BODY MASS INDEX: 40.96 KG/M2 | TEMPERATURE: 96.8 F

## 2018-06-14 DIAGNOSIS — I10 HYPERTENSION GOAL BP (BLOOD PRESSURE) < 150/90: Primary | ICD-10-CM

## 2018-06-14 DIAGNOSIS — R06.2 WHEEZING: ICD-10-CM

## 2018-06-14 PROCEDURE — 99213 OFFICE O/P EST LOW 20 MIN: CPT | Performed by: PHYSICIAN ASSISTANT

## 2018-06-14 RX ORDER — METOPROLOL SUCCINATE 25 MG/1
25 TABLET, EXTENDED RELEASE ORAL DAILY
Qty: 30 TABLET | Refills: 1 | Status: SHIPPED | OUTPATIENT
Start: 2018-06-14 | End: 2018-06-28

## 2018-06-14 ASSESSMENT — PAIN SCALES - GENERAL: PAINLEVEL: NO PAIN (0)

## 2018-06-14 NOTE — MR AVS SNAPSHOT
"              After Visit Summary   6/14/2018    Flores Cuello    MRN: [id number]           Patient Information     Date Of Birth          12/10/1934        Visit Information        Provider Department      6/14/2018 4:10 PM Emery Kuhn PA-C [facility]        Today's Diagnoses     Hypertension goal BP (blood pressure) < 150/90    -  1    Wheezing           Follow-ups after your visit        Follow-up notes from your care team     Return in about 2 weeks (around 6/28/2018) for BP Recheck.      Who to contact     If you have questions or need follow up information about today's clinic visit or your schedule please contact [facility] directly at [phone].  Normal or non-critical lab and imaging results will be communicated to you by MyChart, letter or phone within 4 business days after the clinic has received the results. If you do not hear from us within 7 days, please contact the clinic through Allmyappshart or phone. If you have a critical or abnormal lab result, we will notify you by phone as soon as possible.  Submit refill requests through Yogurtistan or call your pharmacy and they will forward the refill request to us. Please allow 3 business days for your refill to be completed.          Additional Information About Your Visit        MyChart Information     Yogurtistan gives you secure access to your electronic health record. If you see a primary care provider, you can also send messages to your care team and make appointments. If you have questions, please call your primary care clinic.  If you do not have a primary care provider, please call [phone] and they will assist you.        Care EveryWhere ID     This is your Care EveryWhere ID. This could be used by other organizations to access your [place] medical records  [id number]        Your Vitals Were     Pulse Temperature Respirations Height Pulse Oximetry BMI (Body Mass Index)    86 96.8  F (36  C) (Oral) 20 4' 6.5\" " (1.384 m) 97% 41.9 kg/m2       Blood Pressure from Last 3 Encounters:   06/14/18 164/90   03/30/18 160/80   03/14/18 171/85    Weight from Last 3 Encounters:   06/14/18 177 lb (80.3 kg)   03/30/18 182 lb (82.6 kg)   03/14/18 185 lb (83.9 kg)              Today, you had the following     No orders found for display         Today's Medication Changes          These changes are accurate as of 6/14/18  4:38 PM.  If you have any questions, ask your nurse or doctor.               These medicines have changed or have updated prescriptions.        Dose/Directions    * metoprolol succinate 50 MG 24 hr tablet   Commonly known as:  TOPROL-XL   This may have changed:  Another medication with the same name was added. Make sure you understand how and when to take each.   Used for:  Benign essential hypertension   Changed by:  Emery Kuhn PA-C        TAKE 1 TABLET(50 MG) BY MOUTH DAILY   Quantity:  30 tablet   Refills:  0       * metoprolol succinate 25 MG 24 hr tablet   Commonly known as:  TOPROL-XL   This may have changed:  You were already taking a medication with the same name, and this prescription was added. Make sure you understand how and when to take each.   Used for:  Hypertension goal BP (blood pressure) < 150/90   Changed by:  Emery Kuhn PA-C        Dose:  25 mg   Take 1 tablet (25 mg) by mouth daily   Quantity:  30 tablet   Refills:  1       * Notice:  This list has 2 medication(s) that are the same as other medications prescribed for you. Read the directions carefully, and ask your doctor or other care provider to review them with you.         Where to get your medicines      These medications were sent to [id number] - [place] - [address] AT [facility] & [place]  [address] [place] [address]     Phone:  [phone]     metoprolol succinate 25 MG 24 hr tablet                Primary Care Provider Office Phone # Fax #    Emery Mitchell  MAHNAZ Kuhn [phone]       [address] [facility] [address]        Equal Access to Services     VARGHESE GARCIA : Hadii aad ku hadleah Mathis, hayderda shahbazalfredo, nicolta kakikida guillermina, ned holloway laDomingachato diaz. So [facility] [phone].    ATENCIÓN: Si habla español, tiene a white disposición servicios gratuitos de asistencia lingüística. Llame al [phone].    We comply with applicable federal civil rights laws and [place] laws. We do not discriminate on the basis of race, color, national origin, age, disability, sex, sexual orientation, or gender identity.            Thank you!     Thank you for choosing [facility]  for your care. Our goal is always to provide you with excellent care. Hearing back from our patients is one way we can continue to improve our services. Please take a few minutes to complete the written survey that you may receive in the mail after your visit with us. Thank you!             Your Updated Medication List - Protect others around you: Learn how to safely use, store and throw away your medicines at www.disposemymeds.org.          This list is accurate as of 6/14/18  4:38 PM.  Always use your most recent med list.                   Brand Name Dispense Instructions for use Diagnosis    albuterol 108 (90 Base) MCG/ACT Inhaler    PROAIR HFA/PROVENTIL HFA/VENTOLIN HFA    1 Inhaler    Inhale 2 puffs into the lungs every 6 hours as needed for shortness of breath / dyspnea or wheezing    Wheezing       aspirin 81 MG chewable tablet     108 tablet    Take 1 tablet (81 mg) by mouth daily    Hypertension goal BP (blood pressure) < 140/90       COD LIVER OIL PO      Take  by mouth.        DAILY MULTIVITAMIN PO      Take  by mouth daily.        fluticasone 50 MCG/ACT spray    FLONASE    16 mL    SHAKE LIQUID AND USE 2 SPRAYS IN EACH NOSTRIL DAILY AS NEEDED FOR RHINITIS OR ALLERGIES    Seasonal allergic rhinitis       hydrochlorothiazide 25 MG tablet     HYDRODIURIL    30 tablet    TAKE 1 TABLET(25 MG) BY MOUTH DAILY    Benign essential hypertension       MAGNESIUM OXIDE PO           * metoprolol succinate 50 MG 24 hr tablet    TOPROL-XL    30 tablet    TAKE 1 TABLET(50 MG) BY MOUTH DAILY    Benign essential hypertension       * metoprolol succinate 25 MG 24 hr tablet    TOPROL-XL    30 tablet    Take 1 tablet (25 mg) by mouth daily    Hypertension goal BP (blood pressure) < 150/90       VITAMIN D-3 PO      Take by mouth daily        VITAMIN E      daily        * Notice:  This list has 2 medication(s) that are the same as other medications prescribed for you. Read the directions carefully, and ask your doctor or other care provider to review them with you.

## 2018-06-14 NOTE — PROGRESS NOTES
SUBJECTIVE:   Flores Cuello is a 83 year old female who presents to clinic today for the following health issues:      ED/UC Followup:    [facility]ility:  [facility]  Date of visit: 18  Reason for visit: Asthma exacerbation abd then her BP was elevated  Current Status: Asthma-she feels better and has not had any episodes since.   She has not had her BP checked since she left the hospital     Care Everywhere Reviewed      Problem list and histories reviewed & adjusted, as indicated.  Additional history: as documented    Patient Active Problem List   Diagnosis     Impaired fasting glucose     Advance directive discussed with patient     Hyperlipidemia LDL goal <130     High cholesterol     Benign essential hypertension     Hypertension goal BP (blood pressure) < 150/90     Past Surgical History:   Procedure Laterality Date      SECTION        SECTION         Social History   Substance Use Topics     Smoking status: Never Smoker     Smokeless tobacco: Never Used     Alcohol use No     No family history on file.      Current Outpatient Prescriptions   Medication Sig Dispense Refill     albuterol (PROAIR HFA, PROVENTIL HFA, VENTOLIN HFA) 108 (90 BASE) MCG/ACT inhaler Inhale 2 puffs into the lungs every 6 hours as needed for shortness of breath / dyspnea or wheezing 1 Inhaler 1     aspirin 81 MG chewable tablet Take 1 tablet (81 mg) by mouth daily 108 tablet 3     Cholecalciferol (VITAMIN D-3 PO) Take by mouth daily       COD LIVER OIL PO Take  by mouth.       fluticasone (FLONASE) 50 MCG/ACT spray SHAKE LIQUID AND USE 2 SPRAYS IN EACH NOSTRIL DAILY AS NEEDED FOR RHINITIS OR ALLERGIES 16 mL 2     hydrochlorothiazide (HYDRODIURIL) 25 MG tablet TAKE 1 TABLET(25 MG) BY MOUTH DAILY 30 tablet 0     MAGNESIUM OXIDE PO        metoprolol succinate (TOPROL-XL) 25 MG 24 hr tablet Take 1 tablet (25 mg) by mouth daily 30 tablet 1     metoprolol succinate (TOPROL-XL) 50 MG 24 hr tablet TAKE 1 TABLET(50 MG) BY  "MOUTH DAILY 30 tablet 0     Multiple Vitamin (DAILY MULTIVITAMIN PO) Take  by mouth daily.       VITAMIN E daily       Allergies   Allergen Reactions     Antihistamine [Altaryl]      Palpitations, \"funny in the head/foggy\"     Lisinopril      Patient states \"felt shaky\"     Simvastatin      \"felt shaky\"     Amoxicillin Rash     Penicillins Rash     BP Readings from Last 3 Encounters:   06/14/18 164/90   03/30/18 160/80   03/14/18 171/85    Wt Readings from Last 3 Encounters:   06/14/18 177 lb (80.3 kg)   03/30/18 182 lb (82.6 kg)   03/14/18 185 lb (83.9 kg)          Reviewed and updated as needed this visit by clinical staff  Tobacco  Allergies  Meds       Reviewed and updated as needed this visit by Provider         ROS:  Constitutional, HEENT, cardiovascular, pulmonary, gi and gu systems are negative, except as otherwise noted.    OBJECTIVE:     /90  Pulse 86  Temp 96.8  F (36  C) (Oral)  Resp 20  Ht 4' 6.5\" (1.384 m)  Wt 177 lb (80.3 kg)  SpO2 97%  BMI 41.9 kg/m2  Body mass index is 41.9 kg/(m^2).  GENERAL: healthy, alert and no distress  RESP: lungs clear to auscultation - no rales, rhonchi or wheezes  CV: regular rate and rhythm, normal S1 S2, no S3 or S4, no murmur, click or rub, no peripheral edema and peripheral pulses strong  MS: no gross musculoskeletal defects noted, no edema    Diagnostic Test Results:  none     ASSESSMENT/PLAN:         ICD-10-CM    1. Hypertension goal BP (blood pressure) < 150/90 I10 metoprolol succinate (TOPROL-XL) 25 MG 24 hr tablet   2. Wheezing R06.2    1. Increase metoprolol to 75mg daily. Recheck 2 wks. warning signs discussed. side effects discussed  2. Stable ok for albuterol as needed. warning signs discussed.     Emery Kuhn PA-C  [facility]      "

## 2018-06-14 NOTE — NURSING NOTE
"Chief Complaint   Patient presents with     ER F/U       Initial /66  Pulse 86  Temp 96.8  F (36  C) (Oral)  Resp 20  Ht 4' 6.5\" (1.384 m)  Wt 177 lb (80.3 kg)  SpO2 97%  BMI 41.9 kg/m2 Estimated body mass index is 41.9 kg/(m^2) as calculated from the following:    Height as of this encounter: 4' 6.5\" (1.384 m).    Weight as of this encounter: 177 lb (80.3 kg)..  BP completed using cuff size: regular    "

## 2018-06-28 ENCOUNTER — OFFICE VISIT (OUTPATIENT)
Dept: FAMILY MEDICINE | Facility: CLINIC | Age: 83
End: 2018-06-28
Payer: COMMERCIAL

## 2018-06-28 VITALS
WEIGHT: 176 LBS | HEIGHT: 55 IN | BODY MASS INDEX: 40.73 KG/M2 | DIASTOLIC BLOOD PRESSURE: 78 MMHG | SYSTOLIC BLOOD PRESSURE: 148 MMHG | RESPIRATION RATE: 16 BRPM | OXYGEN SATURATION: 98 % | HEART RATE: 79 BPM

## 2018-06-28 DIAGNOSIS — I10 BENIGN ESSENTIAL HYPERTENSION: ICD-10-CM

## 2018-06-28 PROCEDURE — 99213 OFFICE O/P EST LOW 20 MIN: CPT | Performed by: PHYSICIAN ASSISTANT

## 2018-06-28 RX ORDER — METOPROLOL SUCCINATE 50 MG/1
TABLET, EXTENDED RELEASE ORAL
Qty: 90 TABLET | Refills: 1 | Status: SHIPPED | OUTPATIENT
Start: 2018-06-28 | End: 2018-11-28

## 2018-06-28 RX ORDER — HYDROCHLOROTHIAZIDE 25 MG/1
TABLET ORAL
Qty: 90 TABLET | Refills: 1 | Status: SHIPPED | OUTPATIENT
Start: 2018-06-28 | End: 2018-12-27

## 2018-06-28 NOTE — NURSING NOTE
"Chief Complaint   Patient presents with     Hypertension       Initial BP (!) 180/91  Pulse 79  Resp 16  Ht 4' 6.5\" (1.384 m)  Wt 176 lb (79.8 kg)  SpO2 98%  BMI 41.66 kg/m2 Estimated body mass index is 41.66 kg/(m^2) as calculated from the following:    Height as of this encounter: 4' 6.5\" (1.384 m).    Weight as of this encounter: 176 lb (79.8 kg).  Medication Reconciliation: complete  Nidhi Schuster CMA    "

## 2018-06-28 NOTE — MR AVS SNAPSHOT
"              After Visit Summary   6/28/2018    Flores Cuello    MRN: [id number]           Patient Information     Date Of Birth          12/10/1934        Visit Information        Provider Department      6/28/2018 4:10 PM Emery Kuhn PA-C [facility]        Today's Diagnoses     Benign essential hypertension           Follow-ups after your visit        Who to contact     If you have questions or need follow up information about today's clinic visit or your schedule please contact [facility] directly at [phone].  Normal or non-critical lab and imaging results will be communicated to you by [employer] Chemicalshart, letter or phone within 4 business days after the clinic has received the results. If you do not hear from us within 7 days, please contact the clinic through Entelost or phone. If you have a critical or abnormal lab result, we will notify you by phone as soon as possible.  Submit refill requests through VasSol or call your pharmacy and they will forward the refill request to us. Please allow 3 business days for your refill to be completed.          Additional Information About Your Visit        MyChart Information     VasSol gives you secure access to your electronic health record. If you see a primary care provider, you can also send messages to your care team and make appointments. If you have questions, please call your primary care clinic.  If you do not have a primary care provider, please call [phone] and they will assist you.        Care EveryWhere ID     This is your Care EveryWhere ID. This could be used by other organizations to access your [place] medical records  [id number]        Your Vitals Were     Pulse Respirations Height Pulse Oximetry BMI (Body Mass Index)       79 16 4' 6.5\" (1.384 m) 98% 41.66 kg/m2        Blood Pressure from Last 3 Encounters:   06/28/18 148/78   06/14/18 164/90   03/30/18 160/80    Weight from Last 3 Encounters:   06/28/18 " 176 lb (79.8 kg)   06/14/18 177 lb (80.3 kg)   03/30/18 182 lb (82.6 kg)              Today, you had the following     No orders found for display         Where to get your medicines      These medications were sent to [id number] - [place], [place] - [address] AT [facility] & [place]  [address] [facility], [place] [address]     Phone:  [phone]     hydrochlorothiazide 25 MG tablet    metoprolol succinate 50 MG 24 hr tablet          Primary Care Provider Office Phone # Fax #    Emery Kuhn PA-C [phone] [address] [facility] [address]        Equal Access to Services     VARGHESE GARCIA : Hadii nichol ramirez hadasho Soomaali, waaxda luqadaha, qaybta kaalmada adeegyada, waxgracie nelson . So [facility] [phone].    ATENCIÓN: Si habla español, tiene a white disposición servicios gratuitos de asistencia lingüística. [facility] [phone].    We comply with applicable federal civil rights laws and [place] laws. We do not discriminate on the basis of race, color, national origin, age, disability, sex, sexual orientation, or gender identity.            Thank you!     Thank you for choosing [facility]  for your care. Our goal is always to provide you with excellent care. Hearing back from our patients is one way we can continue to improve our services. Please take a few minutes to complete the written survey that you may receive in the mail after your visit with us. Thank you!             Your Updated Medication List - Protect others around you: Learn how to safely use, store and throw away your medicines at www.disposemymeds.org.          This list is accurate as of 6/28/18  4:33 PM.  Always use your most recent med list.                   Brand Name Dispense Instructions for use Diagnosis    albuterol 108 (90 Base) MCG/ACT Inhaler    PROAIR HFA/PROVENTIL HFA/VENTOLIN HFA    1 Inhaler    Inhale 2 puffs into the lungs  every 6 hours as needed for shortness of breath / dyspnea or wheezing    Wheezing       aspirin 81 MG chewable tablet     108 tablet    Take 1 tablet (81 mg) by mouth daily    Hypertension goal BP (blood pressure) < 140/90       COD LIVER OIL PO      Take  by mouth.        DAILY MULTIVITAMIN PO      Take  by mouth daily.        fluticasone 50 MCG/ACT spray    FLONASE    16 mL    SHAKE LIQUID AND USE 2 SPRAYS IN EACH NOSTRIL DAILY AS NEEDED FOR RHINITIS OR ALLERGIES    Seasonal allergic rhinitis       hydrochlorothiazide 25 MG tablet    HYDRODIURIL    90 tablet    TAKE 1 TABLET(25 MG) BY MOUTH DAILY    Benign essential hypertension       MAGNESIUM OXIDE PO           metoprolol succinate 50 MG 24 hr tablet    TOPROL-XL    90 tablet    TAKE 1 TABLET(50 MG) BY MOUTH DAILY    Benign essential hypertension       VITAMIN D-3 PO      Take by mouth daily        VITAMIN E      daily

## 2018-06-28 NOTE — PROGRESS NOTES
SUBJECTIVE:                                                    Flores Cuello is a 83 year old female who presents to clinic today for the following health issues:    Answers for HPI/ROS submitted by the patient on 2018   Chronic problems general questions HPI Form  Outpatient blood pressures: are not being checked  Dietary sodium intake:: No added salt diet      Problem list and histories reviewed & adjusted, as indicated.  Additional history: as documented    Patient Active Problem List   Diagnosis     Impaired fasting glucose     Advance directive discussed with patient     Hyperlipidemia LDL goal <130     High cholesterol     Benign essential hypertension     Hypertension goal BP (blood pressure) < 150/90     Past Surgical History:   Procedure Laterality Date      SECTION        SECTION         Social History   Substance Use Topics     Smoking status: Never Smoker     Smokeless tobacco: Never Used     Alcohol use No     History reviewed. No pertinent family history.      Current Outpatient Prescriptions   Medication Sig Dispense Refill     albuterol (PROAIR HFA, PROVENTIL HFA, VENTOLIN HFA) 108 (90 BASE) MCG/ACT inhaler Inhale 2 puffs into the lungs every 6 hours as needed for shortness of breath / dyspnea or wheezing 1 Inhaler 1     aspirin 81 MG chewable tablet Take 1 tablet (81 mg) by mouth daily 108 tablet 3     Cholecalciferol (VITAMIN D-3 PO) Take by mouth daily       COD LIVER OIL PO Take  by mouth.       fluticasone (FLONASE) 50 MCG/ACT spray SHAKE LIQUID AND USE 2 SPRAYS IN EACH NOSTRIL DAILY AS NEEDED FOR RHINITIS OR ALLERGIES 16 mL 2     hydrochlorothiazide (HYDRODIURIL) 25 MG tablet TAKE 1 TABLET(25 MG) BY MOUTH DAILY 90 tablet 1     MAGNESIUM OXIDE PO        metoprolol succinate (TOPROL-XL) 50 MG 24 hr tablet TAKE 1 TABLET(50 MG) BY MOUTH DAILY 90 tablet 1     Multiple Vitamin (DAILY MULTIVITAMIN PO) Take  by mouth daily.       VITAMIN E daily       [DISCONTINUED]  "hydrochlorothiazide (HYDRODIURIL) 25 MG tablet TAKE 1 TABLET(25 MG) BY MOUTH DAILY 30 tablet 0     [DISCONTINUED] metoprolol succinate (TOPROL-XL) 25 MG 24 hr tablet Take 1 tablet (25 mg) by mouth daily 30 tablet 1     [DISCONTINUED] metoprolol succinate (TOPROL-XL) 50 MG 24 hr tablet TAKE 1 TABLET(50 MG) BY MOUTH DAILY 30 tablet 0     Allergies   Allergen Reactions     Antihistamine [Altaryl]      Palpitations, \"funny in the head/foggy\"     Lisinopril      Patient states \"felt shaky\"     Simvastatin      \"felt shaky\"     Amoxicillin Rash     Penicillins Rash     BP Readings from Last 3 Encounters:   06/28/18 148/78   06/14/18 164/90   03/30/18 160/80    Wt Readings from Last 3 Encounters:   06/28/18 176 lb (79.8 kg)   06/14/18 177 lb (80.3 kg)   03/30/18 182 lb (82.6 kg)                  Labs reviewed in EPIC    ROS:  Constitutional, HEENT, cardiovascular, pulmonary, gi and gu systems are negative, except as otherwise noted.    OBJECTIVE:     /78  Pulse 79  Resp 16  Ht 4' 6.5\" (1.384 m)  Wt 176 lb (79.8 kg)  SpO2 98%  BMI 41.66 kg/m2  Body mass index is 41.66 kg/(m^2).  GENERAL: healthy, alert and no distress  RESP: lungs clear to auscultation - no rales, rhonchi or wheezes  CV: regular rate and rhythm, normal S1 S2, no S3 or S4, no murmur, click or rub, no peripheral edema and peripheral pulses strong  MS: no gross musculoskeletal defects noted, no edema    Diagnostic Test Results:  none     ASSESSMENT/PLAN:         ICD-10-CM    1. Benign essential hypertension I10 hydrochlorothiazide (HYDRODIURIL) 25 MG tablet     metoprolol succinate (TOPROL-XL) 50 MG 24 hr tablet   meds refilled.  Work on Healthy diet and exercise. Getting heart rate elevated for 30 mins most days of week.  Recheck blood pressure in 6 months, sooner if needed.     Emery Kuhn PA-C  [facility]    "

## 2018-08-08 DIAGNOSIS — I10 HYPERTENSION GOAL BP (BLOOD PRESSURE) < 150/90: ICD-10-CM

## 2018-08-08 RX ORDER — METOPROLOL SUCCINATE 25 MG/1
TABLET, EXTENDED RELEASE ORAL
Qty: 30 TABLET | Refills: 0 | OUTPATIENT
Start: 2018-08-08

## 2018-11-26 DIAGNOSIS — I10 BENIGN ESSENTIAL HYPERTENSION: ICD-10-CM

## 2018-11-26 RX ORDER — METOPROLOL SUCCINATE 50 MG/1
TABLET, EXTENDED RELEASE ORAL
Qty: 90 TABLET | Refills: 0 | Status: CANCELLED | OUTPATIENT
Start: 2018-11-26

## 2018-11-27 NOTE — TELEPHONE ENCOUNTER
Patient calling to get a refill on her Metoprolol. She is out. It's for 90 days. Please call to advise. Ok to leave a message.

## 2018-11-28 ENCOUNTER — TELEPHONE (OUTPATIENT)
Dept: FAMILY MEDICINE | Facility: CLINIC | Age: 83
End: 2018-11-28

## 2018-11-28 DIAGNOSIS — I10 BENIGN ESSENTIAL HYPERTENSION: ICD-10-CM

## 2018-11-28 RX ORDER — METOPROLOL SUCCINATE 50 MG/1
TABLET, EXTENDED RELEASE ORAL
Qty: 90 TABLET | Refills: 0 | OUTPATIENT
Start: 2018-11-28

## 2018-11-28 RX ORDER — METOPROLOL SUCCINATE 50 MG/1
TABLET, EXTENDED RELEASE ORAL
Qty: 30 TABLET | Refills: 0 | Status: SHIPPED | OUTPATIENT
Start: 2018-11-28 | End: 2018-12-21

## 2018-11-28 NOTE — TELEPHONE ENCOUNTER
"Pharmacy states last filled 9/8/18.  No refills on file.  Patient due for appointment in December.  30 day refill given.  Attempted to call patient but the \"memory is full\" on her voicemail so unagble to leave message.  Vale Ortega, BENJAMIN    :;  Please sent letter letting her know she is due for fasting labwork and appointment with her provider for her blood pressure follow up in December.   "

## 2018-11-28 NOTE — TELEPHONE ENCOUNTER
Patient is calling again for the refill for metoprolol she is out of this medication     Please call when this has been sent to pharmacy    Thank you

## 2018-11-28 NOTE — LETTER
November 28, 2018    Flores Cuello  [address] [place] [address] [place] [address]      Dear Flores,       We recently received a refill request for metoprolol succinate (TOPROL-XL) 50 MG 24 hr tablet.  We have refilled this for a one time supply only because you are due for a:    Blood Pressure office visit and Fasting lab appointment in December      Please schedule this lab appointment 4-5 days prior to the office visit.     Please call at your earliest convenience so that there will not be a delay with your future refills.          Thank you,   Your [facility] Team/[facility]  [phone]

## 2018-12-19 ENCOUNTER — TELEPHONE (OUTPATIENT)
Dept: FAMILY MEDICINE | Facility: CLINIC | Age: 83
End: 2018-12-19

## 2018-12-19 DIAGNOSIS — I10 BENIGN ESSENTIAL HYPERTENSION: ICD-10-CM

## 2018-12-19 NOTE — TELEPHONE ENCOUNTER
"Per pharmacy:  Last filled 11/28 for 30 days.  Has pending appointment with Emery Kuhn PA-C on 12/27;  Will not need refill prior.  Call to patient.  Hang up x1.  Second time went to a voicemail that states \"memory is full\".  Vale Ortega RN    "

## 2018-12-19 NOTE — TELEPHONE ENCOUNTER
Reason for Call:  Medication or medication refill:    Do you use a [facility]?  Name of the pharmacy and phone number for the current request:  [facility]    Name of the medication requested: metoprolol succinate (TOPROL-XL) 50 MG 24 hr tablet    Other request:     Can we leave a detailed message on this number? YES    Phone number patient can be reached at: Home number on file [phone] (home)    Best Time: any    Call taken on 12/19/2018 at 10:47 AM by Yamilet Scherer

## 2018-12-19 NOTE — TELEPHONE ENCOUNTER
At home number no answer, just rang and rang.  Cell phone - Left a message to return a call to [phone] (Vale) to see if she has enough until appointment.  Olamide Espinoza R.N.

## 2018-12-20 NOTE — TELEPHONE ENCOUNTER
Left message on answering machine for patient to call back.  [phone]  Sisi NARVAEZN, RN, CPN

## 2018-12-21 RX ORDER — METOPROLOL SUCCINATE 50 MG/1
TABLET, EXTENDED RELEASE ORAL
Qty: 30 TABLET | Refills: 0 | Status: SHIPPED | OUTPATIENT
Start: 2018-12-21 | End: 2018-12-27

## 2018-12-21 NOTE — TELEPHONE ENCOUNTER
Patient states she only has 3 tabs left. Taking 1 tab daily. Feels pharmacy did not give her a full 30 day supply with last refill  Refill sent to pharmacy    Sisi NARVAEZN, RN, CPN

## 2018-12-26 NOTE — PROGRESS NOTES
SUBJECTIVE:                                                    Flores Cuello is a 84 year old female seen today who  has a past medical history of High cholesterol (2015), Hypertension, Hypertension goal BP (blood pressure) < 150/90 (3/30/2018), and Impaired fasting glucose (2012).   who presents to clinic today for the following health issues:       Hyperlipidemia Follow-Up      Rate your low fat/cholesterol diet?: good    Taking statin?  No    Other lipid medications/supplements?:  none    Hypertension Follow-up      Outpatient blood pressures are not being checked.    Low Salt Diet: low salt      Amount of exercise or physical activity: Staying active, caretaker for      Problems taking medications regularly: No    Medication side effects: none    Diet: low salt and low fat/cholesterol    Problem list and histories reviewed & adjusted, as indicated.  Additional history: as documented    Patient Active Problem List   Diagnosis     Impaired fasting glucose     Advance directive discussed with patient     Hyperlipidemia LDL goal <130     High cholesterol     Benign essential hypertension     Hypertension goal BP (blood pressure) < 150/90     Past Surgical History:   Procedure Laterality Date      SECTION        SECTION         Social History     Tobacco Use     Smoking status: Never Smoker     Smokeless tobacco: Never Used   Substance Use Topics     Alcohol use: No     History reviewed. No pertinent family history.      Current Outpatient Medications   Medication Sig Dispense Refill     albuterol (PROAIR HFA, PROVENTIL HFA, VENTOLIN HFA) 108 (90 BASE) MCG/ACT inhaler Inhale 2 puffs into the lungs every 6 hours as needed for shortness of breath / dyspnea or wheezing 1 Inhaler 1     aspirin 81 MG chewable tablet Take 1 tablet (81 mg) by mouth daily 108 tablet 3     Cholecalciferol (VITAMIN D-3 PO) Take by mouth daily       COD LIVER OIL PO Take  by mouth.       fluticasone  "(FLONASE) 50 MCG/ACT spray SHAKE LIQUID AND USE 2 SPRAYS IN EACH NOSTRIL DAILY AS NEEDED FOR RHINITIS OR ALLERGIES 16 mL 2     hydrochlorothiazide (HYDRODIURIL) 25 MG tablet TAKE 1 TABLET(25 MG) BY MOUTH DAILY 90 tablet 1     MAGNESIUM OXIDE PO        metoprolol succinate ER (TOPROL-XL) 50 MG 24 hr tablet TAKE 1 TABLET(50 MG) BY MOUTH DAILY 90 tablet 1     Multiple Vitamin (DAILY MULTIVITAMIN PO) Take  by mouth daily.       VITAMIN E daily       Allergies   Allergen Reactions     Antihistamine [Altaryl]      Palpitations, \"funny in the head/foggy\"     Lisinopril      Patient states \"felt shaky\"     Simvastatin      \"felt shaky\"     Amoxicillin Rash     Penicillins Rash     BP Readings from Last 3 Encounters:   12/27/18 140/80   06/28/18 148/78   06/14/18 164/90    Wt Readings from Last 3 Encounters:   12/27/18 78.7 kg (173 lb 6.4 oz)   06/28/18 79.8 kg (176 lb)   06/14/18 80.3 kg (177 lb)                  Labs reviewed in EPIC    ROS:  Constitutional, HEENT, cardiovascular, pulmonary, gi and gu systems are negative, except as otherwise noted.    OBJECTIVE:     /80   Pulse 86   Temp 97.2  F (36.2  C) (Oral)   Resp 18   Wt 78.7 kg (173 lb 6.4 oz)   BMI 41.04 kg/m    Body mass index is 41.04 kg/m .  GENERAL: healthy, alert and no distress  NECK: no adenopathy, no asymmetry, masses, or scars and thyroid normal to palpation  RESP: lungs clear to auscultation - no rales, rhonchi or wheezes  CV: regular rate and rhythm, normal S1 S2, no S3 or S4, no murmur, click or rub, no peripheral edema and peripheral pulses strong  MS: no gross musculoskeletal defects noted, no edema    Diagnostic Test Results:  pending    ASSESSMENT/PLAN:         ICD-10-CM    1. Hypertension goal BP (blood pressure) < 150/90 I10 BASIC METABOLIC PANEL     hydrochlorothiazide (HYDRODIURIL) 25 MG tablet     metoprolol succinate ER (TOPROL-XL) 50 MG 24 hr tablet   2. Asymptomatic postmenopausal status Z78.0 DEXA HIP/PELVIS/SPINE - Future   3. " Hyperlipidemia LDL goal <130 E78.5 Lipid panel reflex to direct LDL Fasting   4. Pre-diabetes R73.03 BASIC METABOLIC PANEL   blood pressure stable. Ok for refills. Recheck blood pressure in 6 months.   Labs pending  Work on Healthy diet and exercise. Getting heart rate elevated for 30 mins most days of week.      Emery Kuhn PA-C  [facility]

## 2018-12-27 ENCOUNTER — OFFICE VISIT (OUTPATIENT)
Dept: FAMILY MEDICINE | Facility: CLINIC | Age: 83
End: 2018-12-27
Payer: COMMERCIAL

## 2018-12-27 VITALS
BODY MASS INDEX: 41.04 KG/M2 | SYSTOLIC BLOOD PRESSURE: 140 MMHG | WEIGHT: 173.4 LBS | HEART RATE: 86 BPM | TEMPERATURE: 97.2 F | DIASTOLIC BLOOD PRESSURE: 80 MMHG | RESPIRATION RATE: 18 BRPM

## 2018-12-27 DIAGNOSIS — R73.03 PRE-DIABETES: ICD-10-CM

## 2018-12-27 DIAGNOSIS — E78.5 HYPERLIPIDEMIA LDL GOAL <130: ICD-10-CM

## 2018-12-27 DIAGNOSIS — Z78.0 ASYMPTOMATIC POSTMENOPAUSAL STATUS: ICD-10-CM

## 2018-12-27 DIAGNOSIS — I10 HYPERTENSION GOAL BP (BLOOD PRESSURE) < 150/90: Primary | ICD-10-CM

## 2018-12-27 LAB
ANION GAP SERPL CALCULATED.3IONS-SCNC: 8 MMOL/L (ref 3–14)
BUN SERPL-MCNC: 19 MG/DL (ref 7–30)
CALCIUM SERPL-MCNC: 8.9 MG/DL (ref 8.5–10.1)
CHLORIDE SERPL-SCNC: 100 MMOL/L (ref 94–109)
CHOLEST SERPL-MCNC: 179 MG/DL
CO2 SERPL-SCNC: 26 MMOL/L (ref 20–32)
CREAT SERPL-MCNC: 0.84 MG/DL (ref 0.52–1.04)
GFR SERPL CREATININE-BSD FRML MDRD: 64 ML/MIN/{1.73_M2}
GLUCOSE SERPL-MCNC: 126 MG/DL (ref 70–99)
HDLC SERPL-MCNC: 54 MG/DL
LDLC SERPL CALC-MCNC: 106 MG/DL
NONHDLC SERPL-MCNC: 125 MG/DL
POTASSIUM SERPL-SCNC: 3.4 MMOL/L (ref 3.4–5.3)
SODIUM SERPL-SCNC: 134 MMOL/L (ref 133–144)
TRIGL SERPL-MCNC: 96 MG/DL

## 2018-12-27 PROCEDURE — 36415 COLL VENOUS BLD VENIPUNCTURE: CPT | Performed by: PHYSICIAN ASSISTANT

## 2018-12-27 PROCEDURE — 80061 LIPID PANEL: CPT | Performed by: PHYSICIAN ASSISTANT

## 2018-12-27 PROCEDURE — 99213 OFFICE O/P EST LOW 20 MIN: CPT | Performed by: PHYSICIAN ASSISTANT

## 2018-12-27 PROCEDURE — 80048 BASIC METABOLIC PNL TOTAL CA: CPT | Performed by: PHYSICIAN ASSISTANT

## 2018-12-27 RX ORDER — HYDROCHLOROTHIAZIDE 25 MG/1
TABLET ORAL
Qty: 90 TABLET | Refills: 1 | Status: SHIPPED | OUTPATIENT
Start: 2018-12-27

## 2018-12-27 RX ORDER — METOPROLOL SUCCINATE 50 MG/1
TABLET, EXTENDED RELEASE ORAL
Qty: 90 TABLET | Refills: 1 | Status: SHIPPED | OUTPATIENT
Start: 2018-12-27 | End: 2019-07-27

## 2018-12-27 NOTE — NURSING NOTE
"Chief Complaint   Patient presents with     Lipids     Hypertension     Health Maintenance     Fall risk, immunizations, phq2       Initial /80   Pulse 86   Temp 97.2  F (36.2  C) (Oral)   Resp 18   Wt 78.7 kg (173 lb 6.4 oz)   BMI 41.04 kg/m   Estimated body mass index is 41.04 kg/m  as calculated from the following:    Height as of 6/28/18: 1.384 m (4' 6.5\").    Weight as of this encounter: 78.7 kg (173 lb 6.4 oz).  Medication Reconciliation: complete  Jeffery Rothman CMA    "

## 2019-03-14 ENCOUNTER — TELEPHONE (OUTPATIENT)
Dept: FAMILY MEDICINE | Facility: CLINIC | Age: 84
End: 2019-03-14

## 2019-03-14 NOTE — TELEPHONE ENCOUNTER
Patient was cancelled today due to Emery's absence. She is reporting that she needs to be seen, next week Emery does not have any later appts- after 3:30-4:00, this is due to her transportation. Tomorrow works very good for her and her ride as the  is on spring break this week. Please call to get her re-scheduled. **PLEASE ONLY CALL HOME NUMBER  As her cell phone does not always work-especially at home. Thank you Please call after 4:00 today as she will be not be home til then.

## 2019-03-14 NOTE — TELEPHONE ENCOUNTER
Called [phone]  3/14/19 but no answer. Left message for pt to call me back at [phone]. Provider is out and pt needs to reschedule 1:30pm apt. Marcela Baldwin TC

## 2019-03-14 NOTE — TELEPHONE ENCOUNTER
Can you fit her in either tomorrow or next Thursday since it's your late day? Marcela Baldwin TC

## 2019-03-20 NOTE — TELEPHONE ENCOUNTER
#30 only as patient is overdue for appointment   Patient was due beginning of Feb for OV.     TC, patient due for:  BP OV with PCP     Marybeth Oquendo RN          
Letter sent to patient to schedule.    Lucy Downs,       
standing up, sitting up

## 2019-03-21 ENCOUNTER — ANCILLARY PROCEDURE (OUTPATIENT)
Dept: ULTRASOUND IMAGING | Facility: CLINIC | Age: 84
End: 2019-03-21
Attending: PHYSICIAN ASSISTANT
Payer: COMMERCIAL

## 2019-03-21 ENCOUNTER — OFFICE VISIT (OUTPATIENT)
Dept: FAMILY MEDICINE | Facility: CLINIC | Age: 84
End: 2019-03-21
Payer: COMMERCIAL

## 2019-03-21 VITALS
BODY MASS INDEX: 38.88 KG/M2 | TEMPERATURE: 98 F | HEIGHT: 55 IN | OXYGEN SATURATION: 99 % | RESPIRATION RATE: 18 BRPM | HEART RATE: 81 BPM | DIASTOLIC BLOOD PRESSURE: 79 MMHG | SYSTOLIC BLOOD PRESSURE: 136 MMHG | WEIGHT: 168 LBS

## 2019-03-21 DIAGNOSIS — M79.89 LEG SWELLING: Primary | ICD-10-CM

## 2019-03-21 DIAGNOSIS — M79.89 LEG SWELLING: ICD-10-CM

## 2019-03-21 DIAGNOSIS — L72.3 SEBACEOUS CYST: ICD-10-CM

## 2019-03-21 DIAGNOSIS — R73.01 IMPAIRED FASTING GLUCOSE: ICD-10-CM

## 2019-03-21 LAB
BASOPHILS # BLD AUTO: 0.1 10E9/L (ref 0–0.2)
BASOPHILS NFR BLD AUTO: 0.5 %
DIFFERENTIAL METHOD BLD: ABNORMAL
EOSINOPHIL # BLD AUTO: 0.2 10E9/L (ref 0–0.7)
EOSINOPHIL NFR BLD AUTO: 1.4 %
ERYTHROCYTE [DISTWIDTH] IN BLOOD BY AUTOMATED COUNT: 15.1 % (ref 10–15)
HBA1C MFR BLD: 6.6 % (ref 0–5.6)
HCT VFR BLD AUTO: 31.7 % (ref 35–47)
HGB BLD-MCNC: 10.1 G/DL (ref 11.7–15.7)
LYMPHOCYTES # BLD AUTO: 2 10E9/L (ref 0.8–5.3)
LYMPHOCYTES NFR BLD AUTO: 15.6 %
MCH RBC QN AUTO: 28.6 PG (ref 26.5–33)
MCHC RBC AUTO-ENTMCNC: 31.9 G/DL (ref 31.5–36.5)
MCV RBC AUTO: 90 FL (ref 78–100)
MONOCYTES # BLD AUTO: 1.4 10E9/L (ref 0–1.3)
MONOCYTES NFR BLD AUTO: 11.1 %
NEUTROPHILS # BLD AUTO: 9.2 10E9/L (ref 1.6–8.3)
NEUTROPHILS NFR BLD AUTO: 71.4 %
PLATELET # BLD AUTO: 437 10E9/L (ref 150–450)
RBC # BLD AUTO: 3.53 10E12/L (ref 3.8–5.2)
WBC # BLD AUTO: 12.9 10E9/L (ref 4–11)

## 2019-03-21 PROCEDURE — 85025 COMPLETE CBC W/AUTO DIFF WBC: CPT | Performed by: PHYSICIAN ASSISTANT

## 2019-03-21 PROCEDURE — 93971 EXTREMITY STUDY: CPT | Mod: LT

## 2019-03-21 PROCEDURE — 83036 HEMOGLOBIN GLYCOSYLATED A1C: CPT | Performed by: PHYSICIAN ASSISTANT

## 2019-03-21 PROCEDURE — 99214 OFFICE O/P EST MOD 30 MIN: CPT | Performed by: PHYSICIAN ASSISTANT

## 2019-03-21 PROCEDURE — 36415 COLL VENOUS BLD VENIPUNCTURE: CPT | Performed by: PHYSICIAN ASSISTANT

## 2019-03-21 ASSESSMENT — MIFFLIN-ST. JEOR: SCORE: 1046.23

## 2019-03-21 NOTE — PROGRESS NOTES
SUBJECTIVE:                                                    Flores Cuello is a 84 year old female who presents to clinic today for the following health issues:    Derm Problem      Duration: 1-2 months     Description (location/character/radiation): left sided lump, tingling at times     Intensity:  moderate    Accompanying signs and symptoms:     History (similar episodes/previous evaluation): pt did fall - unsure if this is related     Precipitating or alleviating factors: None    Therapies tried and outcome: None   No fevers. occ painful. Not going away. No previos problems. Here with granddaughter.   Also shortly after noticing lump, developed diffuse left leg swelling. Minimal pain. Worse at end of day.     Last labs showed increase glucose.     Problem list and histories reviewed & adjusted, as indicated.  Additional history: as documented      Patient Active Problem List   Diagnosis     Impaired fasting glucose     Advance directive discussed with patient     Hyperlipidemia LDL goal <130     High cholesterol     Benign essential hypertension     Hypertension goal BP (blood pressure) < 150/90     Past Surgical History:   Procedure Laterality Date      SECTION        SECTION         Social History     Tobacco Use     Smoking status: Never Smoker     Smokeless tobacco: Never Used   Substance Use Topics     Alcohol use: No     History reviewed. No pertinent family history.      Current Outpatient Medications   Medication Sig Dispense Refill     albuterol (PROAIR HFA, PROVENTIL HFA, VENTOLIN HFA) 108 (90 BASE) MCG/ACT inhaler Inhale 2 puffs into the lungs every 6 hours as needed for shortness of breath / dyspnea or wheezing 1 Inhaler 1     aspirin 81 MG chewable tablet Take 1 tablet (81 mg) by mouth daily 108 tablet 3     Cholecalciferol (VITAMIN D-3 PO) Take by mouth daily       COD LIVER OIL PO Take  by mouth.       fluticasone (FLONASE) 50 MCG/ACT spray SHAKE LIQUID AND USE 2  "SPRAYS IN EACH NOSTRIL DAILY AS NEEDED FOR RHINITIS OR ALLERGIES 16 mL 2     hydrochlorothiazide (HYDRODIURIL) 25 MG tablet TAKE 1 TABLET(25 MG) BY MOUTH DAILY 90 tablet 1     MAGNESIUM OXIDE PO        metoprolol succinate ER (TOPROL-XL) 50 MG 24 hr tablet TAKE 1 TABLET(50 MG) BY MOUTH DAILY 90 tablet 1     Multiple Vitamin (DAILY MULTIVITAMIN PO) Take  by mouth daily.       VITAMIN E daily       Allergies   Allergen Reactions     Antihistamine [Altaryl]      Palpitations, \"funny in the head/foggy\"     Lisinopril      Patient states \"felt shaky\"     Simvastatin      \"felt shaky\"     Amoxicillin Rash     Penicillins Rash     BP Readings from Last 3 Encounters:   03/21/19 136/79   12/27/18 140/80   06/28/18 148/78    Wt Readings from Last 3 Encounters:   03/21/19 76.2 kg (168 lb)   12/27/18 78.7 kg (173 lb 6.4 oz)   06/28/18 79.8 kg (176 lb)                  Labs reviewed in EPIC    ROS:  Constitutional, HEENT, cardiovascular, pulmonary, gi and gu systems are negative, except as otherwise noted.    OBJECTIVE:     /79   Pulse 81   Temp 98  F (36.7  C) (Oral)   Resp 18   Ht 1.384 m (4' 6.5\")   Wt 76.2 kg (168 lb)   SpO2 99%   BMI 39.77 kg/m    Body mass index is 39.77 kg/m .  GENERAL: healthy, alert and no distress  RESP: lungs clear to auscultation - no rales, rhonchi or wheezes  CV: regular rate and rhythm, normal S1 S2, no S3 or S4, no murmur, click or rub, no peripheral edema and peripheral pulses strong  ABDOMEN: soft, nontender, no hepatosplenomegaly, no masses and bowel sounds normal  Diffuse left leg edema. Calf firm, non-tender.   Left inguinal region: large: \"baseball\" sized cystic legion.     Diagnostic Test Results:  Results for orders placed or performed in visit on 03/21/19 (from the past 24 hour(s))   Hemoglobin A1c   Result Value Ref Range    Hemoglobin A1C 6.6 (H) 0 - 5.6 %   CBC with platelets differential   Result Value Ref Range    WBC 12.9 (H) 4.0 - 11.0 10e9/L    RBC Count 3.53 (L) 3.8 " - 5.2 10e12/L    Hemoglobin 10.1 (L) 11.7 - 15.7 g/dL    Hematocrit 31.7 (L) 35.0 - 47.0 %    MCV 90 78 - 100 fl    MCH 28.6 26.5 - 33.0 pg    MCHC 31.9 31.5 - 36.5 g/dL    RDW 15.1 (H) 10.0 - 15.0 %    Platelet Count 437 150 - 450 10e9/L    % Neutrophils 71.4 %    % Lymphocytes 15.6 %    % Monocytes 11.1 %    % Eosinophils 1.4 %    % Basophils 0.5 %    Absolute Neutrophil 9.2 (H) 1.6 - 8.3 10e9/L    Absolute Lymphocytes 2.0 0.8 - 5.3 10e9/L    Absolute Monocytes 1.4 (H) 0.0 - 1.3 10e9/L    Absolute Eosinophils 0.2 0.0 - 0.7 10e9/L    Absolute Basophils 0.1 0.0 - 0.2 10e9/L    Diff Method Automated Method        ASSESSMENT/PLAN:       ICD-10-CM    1. Leg swelling M79.89 US Lower Extremity Venous Duplex Left     CBC with platelets differential   2. Sebaceous cyst L72.3 GENERAL SURG ADULT REFERRAL   3. Impaired fasting glucose R73.01 Hemoglobin A1c   1. U.S stat read today   2. Follow up with Gen surgery next week. warning signs discussed.   3. Lab pending    Emery Kuhn PA-C  [facility]

## 2019-03-22 ENCOUNTER — TELEPHONE (OUTPATIENT)
Dept: FAMILY MEDICINE | Facility: CLINIC | Age: 84
End: 2019-03-22

## 2019-03-22 NOTE — TELEPHONE ENCOUNTER
Spoke to patient on phone about lab and US results.  Recommend seeing Gen surgery very soon.     Also recheck in clinic to discussed new onset DM.     Please help Flores Cuello make gen surgery apt in the next 1or 2. Call her granddaughter to schedule as she is her transportation.     Also follow up  With me for new onset DM in 1-2 wks.     Emery Kuhn PA-C

## 2019-03-25 ENCOUNTER — OFFICE VISIT (OUTPATIENT)
Dept: SURGERY | Facility: CLINIC | Age: 84
End: 2019-03-25
Payer: COMMERCIAL

## 2019-03-25 ENCOUNTER — ANCILLARY PROCEDURE (OUTPATIENT)
Dept: CT IMAGING | Facility: CLINIC | Age: 84
End: 2019-03-25
Attending: SURGERY
Payer: COMMERCIAL

## 2019-03-25 VITALS
SYSTOLIC BLOOD PRESSURE: 172 MMHG | BODY MASS INDEX: 34.68 KG/M2 | DIASTOLIC BLOOD PRESSURE: 75 MMHG | HEIGHT: 59 IN | HEART RATE: 82 BPM | WEIGHT: 172 LBS

## 2019-03-25 DIAGNOSIS — L72.3 INFECTED SEBACEOUS CYST: Primary | ICD-10-CM

## 2019-03-25 DIAGNOSIS — L08.9 INFECTED SEBACEOUS CYST: Primary | ICD-10-CM

## 2019-03-25 DIAGNOSIS — L72.3 SEBACEOUS CYST: Primary | ICD-10-CM

## 2019-03-25 DIAGNOSIS — L72.3 INFECTED SEBACEOUS CYST: ICD-10-CM

## 2019-03-25 DIAGNOSIS — L08.9 INFECTED SEBACEOUS CYST: ICD-10-CM

## 2019-03-25 PROCEDURE — 74177 CT ABD & PELVIS W/CONTRAST: CPT | Mod: TC

## 2019-03-25 PROCEDURE — 99204 OFFICE O/P NEW MOD 45 MIN: CPT | Performed by: SURGERY

## 2019-03-25 RX ORDER — IOPAMIDOL 755 MG/ML
200 INJECTION, SOLUTION INTRAVASCULAR ONCE
Status: COMPLETED | OUTPATIENT
Start: 2019-03-25 | End: 2019-03-25

## 2019-03-25 RX ADMIN — IOPAMIDOL 84 ML: 755 INJECTION, SOLUTION INTRAVASCULAR at 16:14

## 2019-03-25 RX ADMIN — Medication 71 ML: at 16:14

## 2019-03-25 ASSESSMENT — MIFFLIN-ST. JEOR: SCORE: 1135.82

## 2019-03-25 NOTE — LETTER
3/25/2019         RE: Flores Cuello  [address] [place] [address]  [place] [address]        Dear Colleague,    Thank you for referring your patient, Flores Cuello, to the [facility]. Please see a copy of my visit note below.    Patient seen in consultation for LLQ lump by Emery Kuhn    HPI:  Patient is a 84 year old female  with complaints of lump in LLQ area  The patient noticed the symptoms about 2 months ago.    Started smaller, has enlarged with time  Feels like a pressure discomfort there  Area has some redness to it. Doesn't think it was initially  No drainage  Aspirin makes the episode better.  Patient has family history of skin cyst problems. Also history of hernias  Left leg swollen as well in same time frame as this lump    Review Of Systems    Skin: as above  Ears/Nose/Throat: negative  Respiratory: asthma, does not need inhaler very much  Cardiovascular: negative  Gastrointestinal: negative  Genitourinary: negative  Musculoskeletal: as above  Neurologic: negative  Hematologic/Lymphatic/Immunologic: Left leg swelling  Endocrine: negative      Past Medical History:   Diagnosis Date     High cholesterol 2015     Hypertension      Hypertension goal BP (blood pressure) < 150/90 3/30/2018     Impaired fasting glucose 2012       Past Surgical History:   Procedure Laterality Date      SECTION        SECTION  1967       Social History     Socioeconomic History     Marital status:      Spouse name: Not on file     Number of children: Not on file     Years of education: Not on file     Highest education level: Not on file   Occupational History     Not on file   Social Needs     Financial resource strain: Not on file     Food insecurity:     Worry: Not on file     Inability: Not on file     Transportation needs:     Medical: Not on file     Non-medical: Not on file   Tobacco Use     Smoking status: Never Smoker     Smokeless tobacco: Never Used  "  Substance and Sexual Activity     Alcohol use: No     Drug use: No     Sexual activity: No   Lifestyle     Physical activity:     Days per week: Not on file     Minutes per session: Not on file     Stress: Not on file   Relationships     Social connections:     Talks on phone: Not on file     Gets together: Not on file     Attends [employer] service: Not on file     Active member of club or organization: Not on file     Attends meetings of clubs or organizations: Not on file     Relationship status: Not on file     Intimate partner violence:     Fear of current or ex partner: Not on file     Emotionally abused: Not on file     Physically abused: Not on file     Forced sexual activity: Not on file   Other Topics Concern     Parent/sibling w/ CABG, MI or angioplasty before 65F 55M? Not Asked   Social History Narrative     Not on file       Current Outpatient Medications   Medication Sig Dispense Refill     albuterol (PROAIR HFA, PROVENTIL HFA, VENTOLIN HFA) 108 (90 BASE) MCG/ACT inhaler Inhale 2 puffs into the lungs every 6 hours as needed for shortness of breath / dyspnea or wheezing 1 Inhaler 1     aspirin 81 MG chewable tablet Take 1 tablet (81 mg) by mouth daily 108 tablet 3     Cholecalciferol (VITAMIN D-3 PO) Take by mouth daily       COD LIVER OIL PO Take  by mouth.       fluticasone (FLONASE) 50 MCG/ACT spray SHAKE LIQUID AND USE 2 SPRAYS IN EACH NOSTRIL DAILY AS NEEDED FOR RHINITIS OR ALLERGIES 16 mL 2     hydrochlorothiazide (HYDRODIURIL) 25 MG tablet TAKE 1 TABLET(25 MG) BY MOUTH DAILY 90 tablet 1     MAGNESIUM OXIDE PO        metoprolol succinate ER (TOPROL-XL) 50 MG 24 hr tablet TAKE 1 TABLET(50 MG) BY MOUTH DAILY 90 tablet 1     Multiple Vitamin (DAILY MULTIVITAMIN PO) Take  by mouth daily.       VITAMIN E daily         Medications and history reviewed    Physical exam:  Vitals: /75   Pulse 82   Ht 1.499 m (4' 11\")   Wt 78 kg (172 lb)   BMI 34.74 kg/m     BMI= Body mass index is 34.74 " kg/m .    Constitutional: healthy, alert and no distress  Head: Normocephalic. No masses, lesions, tenderness or abnormalities  Cardiovascular: negative, PMI normal. No lifts, heaves, or thrills. RRR. No murmurs, clicks gallops or rub  Respiratory: negative, Percussion normal. Good diaphragmatic excursion. Lungs clear  Gastrointestinal: Abdomen soft, non-tender. BS normal. No masses, organomegaly. Obese  : Deferred  Musculoskeletal: left leg noticeably swollen compared to right  Skin: Left groin with large irregeular mass maybe as large as 15 cm horizontally. This is irregular. Not reducible. No bowel sounds in area. There is a patch of superficial skin ulceration superiorly. The skin is thinned and reddened over the larger part of this mass.  Psychiatric: mentation appears normal and affect normal/bright  Hematologic/Lymphatic/Immunologic: Normal cervical lymph nodes  Patient able to get up on table with moderate difficulty.    Labs:  Results for BRAULIO ALMEIDA (MRN [id number]) as of 3/25/2019 10:47   Ref. Range 3/21/2019 17:04   Hemoglobin A1C Latest Ref Range: 0 - 5.6 % 6.6 (H)   WBC Latest Ref Range: 4.0 - 11.0 10e9/L 12.9 (H)   Hemoglobin Latest Ref Range: 11.7 - 15.7 g/dL 10.1 (L)   Hematocrit Latest Ref Range: 35.0 - 47.0 % 31.7 (L)   Platelet Count Latest Ref Range: 150 - 450 10e9/L 437   RBC Count Latest Ref Range: 3.8 - 5.2 10e12/L 3.53 (L)   MCV Latest Ref Range: 78 - 100 fl 90   MCH Latest Ref Range: 26.5 - 33.0 pg 28.6   MCHC Latest Ref Range: 31.5 - 36.5 g/dL 31.9   RDW Latest Ref Range: 10.0 - 15.0 % 15.1 (H)   Diff Method Unknown Automated Method   % Neutrophils Latest Units: % 71.4   % Lymphocytes Latest Units: % 15.6   % Monocytes Latest Units: % 11.1   % Eosinophils Latest Units: % 1.4   % Basophils Latest Units: % 0.5   Absolute Neutrophil Latest Ref Range: 1.6 - 8.3 10e9/L 9.2 (H)   Absolute Lymphocytes Latest Ref Range: 0.8 - 5.3 10e9/L 2.0   Absolute Monocytes Latest Ref Range: 0.0 - 1.3  10e9/L 1.4 (H)   Absolute Eosinophils Latest Ref Range: 0.0 - 0.7 10e9/L 0.2   Absolute Basophils Latest Ref Range: 0.0 - 0.2 10e9/L 0.1     Imaging shows:  US LOWER EXTREMITY VENOUS DUPLEX LEFT  3/21/2019 6:38 PM     HISTORY: Leg swelling     TECHNIQUE: Venous Doppler US including color flow and Doppler waveform  analysis.     FINDINGS: Left groin 8.4 x 5.5 x 8.9 cm complex cyst was noted.  Junction of the greater saphenous vein/femoral vein is not well seen  due to this cyst. No thrombus was observed and there was normal  compressibility, phasic flow, and augmentation within the common  femoral, femoral, popliteal, and posterior tibial veins. Peroneal  veins could not be visualized.                                                                      IMPRESSION:  1. Left groin 8.9 cm complex cyst.  2. No evidence of  left lower extremity deep venous thrombosis.    Assessment:     ICD-10-CM    1. Infected sebaceous cyst L72.3 CT Abdomen Pelvis w/o & w Contrast    L08.9      Plan: I suspect this mass is from a large infected cyst/abscess as appeared cystic on US and has elevated WBC, but is more irregular and much larger than usual for infected cysts. I think hernia unlikely though is still possible. Other type of mass/tumor also possible so will get CT for additional images including rest of abdomen and pelvis. Once able to see those images I can better say what to do surgically. Regardless surgery would be in OR [facility] at [facility]. Patient OK scheduling once I can review the images. She does not appear ill/septic    Reynaldo Packer MD      Again, thank you for allowing me to participate in the care of your patient.        Sincerely,        Reynaldo Packer MD

## 2019-03-25 NOTE — PROGRESS NOTES
Patient seen in consultation for LLQ lump by Emery Kuhn    HPI:  Patient is a 84 year old female  with complaints of lump in LLQ area  The patient noticed the symptoms about 2 months ago.    Started smaller, has enlarged with time  Feels like a pressure discomfort there  Area has some redness to it. Doesn't think it was initially  No drainage  Aspirin makes the episode better.  Patient has family history of skin cyst problems. Also history of hernias  Left leg swollen as well in same time frame as this lump    Review Of Systems    Skin: as above  Ears/Nose/Throat: negative  Respiratory: asthma, does not need inhaler very much  Cardiovascular: negative  Gastrointestinal: negative  Genitourinary: negative  Musculoskeletal: as above  Neurologic: negative  Hematologic/Lymphatic/Immunologic: Left leg swelling  Endocrine: negative      Past Medical History:   Diagnosis Date     High cholesterol 2015     Hypertension      Hypertension goal BP (blood pressure) < 150/90 3/30/2018     Impaired fasting glucose 2012       Past Surgical History:   Procedure Laterality Date      SECTION  6      SECTION  1967       Social History     Socioeconomic History     Marital status:      Spouse name: Not on file     Number of children: Not on file     Years of education: Not on file     Highest education level: Not on file   Occupational History     Not on file   Social Needs     Financial resource strain: Not on file     Food insecurity:     Worry: Not on file     Inability: Not on file     Transportation needs:     Medical: Not on file     Non-medical: Not on file   Tobacco Use     Smoking status: Never Smoker     Smokeless tobacco: Never Used   Substance and Sexual Activity     Alcohol use: No     Drug use: No     Sexual activity: No   Lifestyle     Physical activity:     Days per week: Not on file     Minutes per session: Not on file     Stress: Not on file   Relationships     Social  "connections:     Talks on phone: Not on file     Gets together: Not on file     Attends [employer] service: Not on file     Active member of club or organization: Not on file     Attends meetings of clubs or organizations: Not on file     Relationship status: Not on file     Intimate partner violence:     Fear of current or ex partner: Not on file     Emotionally abused: Not on file     Physically abused: Not on file     Forced sexual activity: Not on file   Other Topics Concern     Parent/sibling w/ CABG, MI or angioplasty before 65F 55M? Not Asked   Social History Narrative     Not on file       Current Outpatient Medications   Medication Sig Dispense Refill     albuterol (PROAIR HFA, PROVENTIL HFA, VENTOLIN HFA) 108 (90 BASE) MCG/ACT inhaler Inhale 2 puffs into the lungs every 6 hours as needed for shortness of breath / dyspnea or wheezing 1 Inhaler 1     aspirin 81 MG chewable tablet Take 1 tablet (81 mg) by mouth daily 108 tablet 3     Cholecalciferol (VITAMIN D-3 PO) Take by mouth daily       COD LIVER OIL PO Take  by mouth.       fluticasone (FLONASE) 50 MCG/ACT spray SHAKE LIQUID AND USE 2 SPRAYS IN EACH NOSTRIL DAILY AS NEEDED FOR RHINITIS OR ALLERGIES 16 mL 2     hydrochlorothiazide (HYDRODIURIL) 25 MG tablet TAKE 1 TABLET(25 MG) BY MOUTH DAILY 90 tablet 1     MAGNESIUM OXIDE PO        metoprolol succinate ER (TOPROL-XL) 50 MG 24 hr tablet TAKE 1 TABLET(50 MG) BY MOUTH DAILY 90 tablet 1     Multiple Vitamin (DAILY MULTIVITAMIN PO) Take  by mouth daily.       VITAMIN E daily         Medications and history reviewed    Physical exam:  Vitals: /75   Pulse 82   Ht 1.499 m (4' 11\")   Wt 78 kg (172 lb)   BMI 34.74 kg/m    BMI= Body mass index is 34.74 kg/m .    Constitutional: healthy, alert and no distress  Head: Normocephalic. No masses, lesions, tenderness or abnormalities  Cardiovascular: negative, PMI normal. No lifts, heaves, or thrills. RRR. No murmurs, clicks gallops or rub  Respiratory: " negative, Percussion normal. Good diaphragmatic excursion. Lungs clear  Gastrointestinal: Abdomen soft, non-tender. BS normal. No masses, organomegaly. Obese  : Deferred  Musculoskeletal: left leg noticeably swollen compared to right  Skin: Left groin with large irregeular mass maybe as large as 15 cm horizontally. This is irregular. Not reducible. No bowel sounds in area. There is a patch of superficial skin ulceration superiorly. The skin is thinned and reddened over the larger part of this mass.  Psychiatric: mentation appears normal and affect normal/bright  Hematologic/Lymphatic/Immunologic: Normal cervical lymph nodes  Patient able to get up on table with moderate difficulty.    Labs:  Results for BRAULIO ALMEIDA (MRN [id number]) as of 3/25/2019 10:47   Ref. Range 3/21/2019 17:04   Hemoglobin A1C Latest Ref Range: 0 - 5.6 % 6.6 (H)   WBC Latest Ref Range: 4.0 - 11.0 10e9/L 12.9 (H)   Hemoglobin Latest Ref Range: 11.7 - 15.7 g/dL 10.1 (L)   Hematocrit Latest Ref Range: 35.0 - 47.0 % 31.7 (L)   Platelet Count Latest Ref Range: 150 - 450 10e9/L 437   RBC Count Latest Ref Range: 3.8 - 5.2 10e12/L 3.53 (L)   MCV Latest Ref Range: 78 - 100 fl 90   MCH Latest Ref Range: 26.5 - 33.0 pg 28.6   MCHC Latest Ref Range: 31.5 - 36.5 g/dL 31.9   RDW Latest Ref Range: 10.0 - 15.0 % 15.1 (H)   Diff Method Unknown Automated Method   % Neutrophils Latest Units: % 71.4   % Lymphocytes Latest Units: % 15.6   % Monocytes Latest Units: % 11.1   % Eosinophils Latest Units: % 1.4   % Basophils Latest Units: % 0.5   Absolute Neutrophil Latest Ref Range: 1.6 - 8.3 10e9/L 9.2 (H)   Absolute Lymphocytes Latest Ref Range: 0.8 - 5.3 10e9/L 2.0   Absolute Monocytes Latest Ref Range: 0.0 - 1.3 10e9/L 1.4 (H)   Absolute Eosinophils Latest Ref Range: 0.0 - 0.7 10e9/L 0.2   Absolute Basophils Latest Ref Range: 0.0 - 0.2 10e9/L 0.1     Imaging shows:  US LOWER EXTREMITY VENOUS DUPLEX LEFT  3/21/2019 6:38 PM     HISTORY: Leg  swelling     TECHNIQUE: Venous Doppler US including color flow and Doppler waveform  analysis.     FINDINGS: Left groin 8.4 x 5.5 x 8.9 cm complex cyst was noted.  Junction of the greater saphenous vein/femoral vein is not well seen  due to this cyst. No thrombus was observed and there was normal  compressibility, phasic flow, and augmentation within the common  femoral, femoral, popliteal, and posterior tibial veins. Peroneal  veins could not be visualized.                                                                      IMPRESSION:  1. Left groin 8.9 cm complex cyst.  2. No evidence of  left lower extremity deep venous thrombosis.    Assessment:     ICD-10-CM    1. Infected sebaceous cyst L72.3 CT Abdomen Pelvis w/o & w Contrast    L08.9      Plan: I suspect this mass is from a large infected cyst/abscess as appeared cystic on US and has elevated WBC, but is more irregular and much larger than usual for infected cysts. I think hernia unlikely though is still possible. Other type of mass/tumor also possible so will get CT for additional images including rest of abdomen and pelvis. Once able to see those images I can better say what to do surgically. Regardless surgery would be in OR likely at [facility]. Patient OK scheduling once I can review the images. She does not appear ill/septic    Reynaldo Packer MD

## 2019-03-26 ENCOUNTER — TELEPHONE (OUTPATIENT)
Dept: SURGERY | Facility: CLINIC | Age: 84
End: 2019-03-26

## 2019-03-26 DIAGNOSIS — R19.09 LEFT GROIN MASS: Primary | ICD-10-CM

## 2019-03-26 LAB
CREAT BLD-MCNC: 0.9 MG/DL (ref 0.5–1.2)
GFR SERPL CREATININE-BSD FRML MDRD: 59 ML/MIN/{1.73_M2}
GFRB: 60
RADIOLOGIST FLAGS: ABNORMAL

## 2019-03-26 NOTE — TELEPHONE ENCOUNTER
Reason for call:  Results   Name of test or procedure: CT Abdomen Pelvis  Date of test or procedure: 3/25/19  Location of test or procedure: [facility]    Additional comments: na    Phone number to reach patient:  Other phone number:  [phone]    Best Time:  any    Can we leave a detailed message on this number?  YES

## 2019-03-27 ENCOUNTER — TELEPHONE (OUTPATIENT)
Dept: SURGERY | Facility: CLINIC | Age: 84
End: 2019-03-27

## 2019-03-27 NOTE — TELEPHONE ENCOUNTER
Reason for Call:  Other surgery orders    Detailed comments: Patient's granddaughter asking when we will be calling to schedule surgery. Advise based on test results, need orders if going forward with surgery.    Phone Number Patient can be reached at: Other phone number:  Leona Paulino [phone]    Best Time: any    Can we leave a detailed message on this number? YES    Call taken on 3/27/2019 at 9:52 AM by Wendi Alcantara

## 2019-03-27 NOTE — TELEPHONE ENCOUNTER
Type of surgery: resection left groin mass,possible biopsy left breast lesion CPT code 46152, poss 61404  Left groin mass [R19.09]  - Primary   Location of surgery: [facility]  Date and time of surgery: 4-5-19  1:15pm  Surgeon: Dr Packer  Pre-Op Appt Date: 4-2-19  Post-Op Appt Date: 4-17-19   Packet sent out: Yes  Pre-cert/Authorization completed:  No prior auth per [employer] Nothing needed for [facility]  Date: 03/27/2019    Sent to  and was received.     Insurance valid

## 2019-04-02 ENCOUNTER — OFFICE VISIT (OUTPATIENT)
Dept: FAMILY MEDICINE | Facility: CLINIC | Age: 84
End: 2019-04-02
Payer: COMMERCIAL

## 2019-04-02 VITALS
DIASTOLIC BLOOD PRESSURE: 72 MMHG | SYSTOLIC BLOOD PRESSURE: 133 MMHG | HEIGHT: 59 IN | BODY MASS INDEX: 34.64 KG/M2 | HEART RATE: 92 BPM | WEIGHT: 171.8 LBS | OXYGEN SATURATION: 99 % | RESPIRATION RATE: 18 BRPM | TEMPERATURE: 97.8 F

## 2019-04-02 DIAGNOSIS — L08.9 INFECTED SEBACEOUS CYST: ICD-10-CM

## 2019-04-02 DIAGNOSIS — E87.6 LOW POTASSIUM SYNDROME: ICD-10-CM

## 2019-04-02 DIAGNOSIS — D64.9 ANEMIA, UNSPECIFIED TYPE: ICD-10-CM

## 2019-04-02 DIAGNOSIS — J30.2 SEASONAL ALLERGIC RHINITIS, UNSPECIFIED TRIGGER: ICD-10-CM

## 2019-04-02 DIAGNOSIS — L72.3 INFECTED SEBACEOUS CYST: ICD-10-CM

## 2019-04-02 DIAGNOSIS — Z01.818 PREOP GENERAL PHYSICAL EXAM: Primary | ICD-10-CM

## 2019-04-02 DIAGNOSIS — I10 HYPERTENSION GOAL BP (BLOOD PRESSURE) < 150/90: ICD-10-CM

## 2019-04-02 LAB
ANION GAP SERPL CALCULATED.3IONS-SCNC: 11 MMOL/L (ref 3–14)
BASOPHILS # BLD AUTO: 0.1 10E9/L (ref 0–0.2)
BASOPHILS NFR BLD AUTO: 0.3 %
BUN SERPL-MCNC: 22 MG/DL (ref 7–30)
CALCIUM SERPL-MCNC: 8.9 MG/DL (ref 8.5–10.1)
CHLORIDE SERPL-SCNC: 101 MMOL/L (ref 94–109)
CO2 SERPL-SCNC: 24 MMOL/L (ref 20–32)
CREAT SERPL-MCNC: 0.9 MG/DL (ref 0.52–1.04)
DIFFERENTIAL METHOD BLD: ABNORMAL
EOSINOPHIL # BLD AUTO: 0.2 10E9/L (ref 0–0.7)
EOSINOPHIL NFR BLD AUTO: 1 %
ERYTHROCYTE [DISTWIDTH] IN BLOOD BY AUTOMATED COUNT: 16.2 % (ref 10–15)
GFR SERPL CREATININE-BSD FRML MDRD: 58 ML/MIN/{1.73_M2}
GLUCOSE SERPL-MCNC: 121 MG/DL (ref 70–99)
HCT VFR BLD AUTO: 31 % (ref 35–47)
HGB BLD-MCNC: 9.6 G/DL (ref 11.7–15.7)
LYMPHOCYTES # BLD AUTO: 2 10E9/L (ref 0.8–5.3)
LYMPHOCYTES NFR BLD AUTO: 13 %
MCH RBC QN AUTO: 28.4 PG (ref 26.5–33)
MCHC RBC AUTO-ENTMCNC: 31 G/DL (ref 31.5–36.5)
MCV RBC AUTO: 92 FL (ref 78–100)
MONOCYTES # BLD AUTO: 1.5 10E9/L (ref 0–1.3)
MONOCYTES NFR BLD AUTO: 9.9 %
NEUTROPHILS # BLD AUTO: 11.5 10E9/L (ref 1.6–8.3)
NEUTROPHILS NFR BLD AUTO: 75.8 %
PLATELET # BLD AUTO: 425 10E9/L (ref 150–450)
POTASSIUM SERPL-SCNC: 3.1 MMOL/L (ref 3.4–5.3)
RBC # BLD AUTO: 3.38 10E12/L (ref 3.8–5.2)
SODIUM SERPL-SCNC: 136 MMOL/L (ref 133–144)
WBC # BLD AUTO: 15.2 10E9/L (ref 4–11)

## 2019-04-02 PROCEDURE — 36415 COLL VENOUS BLD VENIPUNCTURE: CPT | Performed by: PHYSICIAN ASSISTANT

## 2019-04-02 PROCEDURE — 93000 ELECTROCARDIOGRAM COMPLETE: CPT | Performed by: PHYSICIAN ASSISTANT

## 2019-04-02 PROCEDURE — 99215 OFFICE O/P EST HI 40 MIN: CPT | Performed by: PHYSICIAN ASSISTANT

## 2019-04-02 PROCEDURE — 85025 COMPLETE CBC W/AUTO DIFF WBC: CPT | Performed by: PHYSICIAN ASSISTANT

## 2019-04-02 PROCEDURE — 80048 BASIC METABOLIC PNL TOTAL CA: CPT | Performed by: PHYSICIAN ASSISTANT

## 2019-04-02 RX ORDER — FLUTICASONE PROPIONATE 50 MCG
SPRAY, SUSPENSION (ML) NASAL
Qty: 16 ML | Refills: 2 | Status: SHIPPED | OUTPATIENT
Start: 2019-04-02 | End: 2019-04-02

## 2019-04-02 ASSESSMENT — MIFFLIN-ST. JEOR: SCORE: 1134.91

## 2019-04-02 NOTE — PROGRESS NOTES
[facility]  [address] [facility] [address] [phone]  Dept: [phone]    PRE-OP EVALUATION:  Today's date: 2019    Flores Cuello (: 12/10/1934) presents for pre-operative evaluation assessment as requested by Dr. Packer.  She requires evaluation and anesthesia risk assessment prior to undergoing surgery/procedure for treatment of cyst removel .    Proposed Surgery/ Procedure: growth removal  Date of Surgery/ Procedure: 19  Time of Surgery/ Procedure: 1:30 pm   Hospital/Surgical Facility: [facility]  Fax number for surgical facility:   Primary Physician: Emery Kuhn  Type of Anesthesia Anticipated: General    Patient has a Health Care Directive or Living Will:  YES     1. NO - Do you have a history of heart attack, stroke, stent, bypass or surgery on an artery in the head, neck, heart or legs?  2. NO - Do you ever have any pain or discomfort in your chest?  3. NO - Do you have a history of  Heart Failure?  4. NO - Are you troubled by shortness of breath when: walking on the level, up a slight hill or at night?  5. NO - Do you currently have a cold, bronchitis or other respiratory infection?  6. NO - Do you have a cough, shortness of breath or wheezing?  7. YES - Do you sometimes get pains in the calves of your legs when you walk? Left leg swelling.   8. NO - Do you or anyone in your family have previous history of blood clots?  9. NO - Do you or does anyone in your family have a serious bleeding problem such as prolonged bleeding following surgeries or cuts?  10. YES - Have you ever had problems with anemia or been told to take iron pills? While pregnant.   11. NO - Have you had any abnormal blood loss such as black, tarry or bloody stools, or abnormal vaginal bleeding?  12. YES - Have you ever had a blood transfusion? Following   13. NO - Have you or any of your relatives ever had problems with anesthesia?  14. NO - Do you have sleep  apnea, excessive snoring or daytime drowsiness?  15. NO - Do you have any prosthetic heart valves?  16. NO - Do you have prosthetic joints?  17. NO - Is there any chance that you may be pregnant?      HPI:     HPI related to upcoming procedure: 3 months of left lower quadrant large cyst.       See problem list for active medical problems.  Problems all longstanding and stable, except as noted/documented.  See ROS for pertinent symptoms related to these conditions.                                                                                                                                                          .    MEDICAL HISTORY:     Patient Active Problem List    Diagnosis Date Noted     Hypertension goal BP (blood pressure) < 150/90 2018     Priority: Medium     Advance directive discussed with patient 2018     Priority: Medium     Discussed advance care planning with patient; however, patient declined at this time. 12: Fartun Contreras RN         Benign essential hypertension 2016     Priority: Medium     High cholesterol 2015     Priority: Medium     Hyperlipidemia LDL goal <130 2012     Priority: Medium     Impaired fasting glucose 2012     Priority: Medium      Past Medical History:   Diagnosis Date     High cholesterol 2015     Hypertension      Hypertension goal BP (blood pressure) < 150/90 3/30/2018     Impaired fasting glucose 2012     Past Surgical History:   Procedure Laterality Date      SECTION        SECTION  1967     Current Outpatient Medications   Medication Sig Dispense Refill     albuterol (PROAIR HFA, PROVENTIL HFA, VENTOLIN HFA) 108 (90 BASE) MCG/ACT inhaler Inhale 2 puffs into the lungs every 6 hours as needed for shortness of breath / dyspnea or wheezing 1 Inhaler 1     aspirin 81 MG chewable tablet Take 1 tablet (81 mg) by mouth daily 108 tablet 3     Cholecalciferol (VITAMIN D-3 PO) Take by mouth daily       COD LIVER OIL  "PO Take  by mouth.       fluticasone (FLONASE) 50 MCG/ACT nasal spray SHAKE LIQUID AND USE 2 SPRAYS IN EACH NOSTRIL DAILY AS NEEDED FOR RHINITIS OR ALLERGIES 16 mL 2     hydrochlorothiazide (HYDRODIURIL) 25 MG tablet TAKE 1 TABLET(25 MG) BY MOUTH DAILY 90 tablet 1     MAGNESIUM OXIDE PO        metoprolol succinate ER (TOPROL-XL) 50 MG 24 hr tablet TAKE 1 TABLET(50 MG) BY MOUTH DAILY 90 tablet 1     Multiple Vitamin (DAILY MULTIVITAMIN PO) Take  by mouth daily.       VITAMIN E daily       OTC products: None, except as noted above    Allergies   Allergen Reactions     Antihistamine [Altaryl]      Palpitations, \"funny in the head/foggy\"     Lisinopril      Patient states \"felt shaky\"     Simvastatin      \"felt shaky\"     Amoxicillin Rash     Penicillins Rash      Latex Allergy: NO    Social History     Tobacco Use     Smoking status: Never Smoker     Smokeless tobacco: Never Used   Substance Use Topics     Alcohol use: No     History   Drug Use No       REVIEW OF SYSTEMS:   CONSTITUTIONAL: NEGATIVE for fever, chills, change in weight  INTEGUMENTARY/SKIN: NEGATIVE for worrisome rashes, moles or lesions  EYES: NEGATIVE for vision changes or irritation  ENT/MOUTH: NEGATIVE for ear, mouth and throat problems  RESP: NEGATIVE for significant cough or SOB  CV: NEGATIVE for chest pain, palpitations or peripheral edema  GI: NEGATIVE for nausea, abdominal pain, heartburn, or change in bowel habits  : NEGATIVE for frequency, dysuria, or hematuria  MUSCULOSKELETAL: NEGATIVE for significant arthralgias or myalgia  NEURO: NEGATIVE for weakness, dizziness or paresthesias  ENDOCRINE: NEGATIVE for temperature intolerance, skin/hair changes  HEME: NEGATIVE for bleeding problems  PSYCHIATRIC: NEGATIVE for changes in mood or affect    EXAM:   /72   Pulse 92   Temp 97.8  F (36.6  C) (Oral)   Resp 18   Ht 1.499 m (4' 11\")   Wt 77.9 kg (171 lb 12.8 oz)   SpO2 99%   BMI 34.70 kg/m      GENERAL APPEARANCE: healthy, alert and no " distress     EYES: EOMI, PERRL     HENT: ear canals and TM's normal and nose and mouth without ulcers or lesions     NECK: no adenopathy, no asymmetry, masses, or scars and thyroid normal to palpation     RESP: lungs clear to auscultation - no rales, rhonchi or wheezes     CV: regular rates and rhythm, normal S1 S2, no S3 or S4 and no murmur, click or rub     ABDOMEN:  soft, nontender, no HSM or masses and bowel sounds normal     MS: extremities normal- no gross deformities noted, no evidence of inflammation in joints, FROM in all extremities.     SKIN: no suspicious lesions or rashes     NEURO: Normal strength and tone, sensory exam grossly normal, mentation intact and speech normal     PSYCH: mentation appears normal. and affect normal/bright     LYMPHATICS: No cervical adenopathy     Left leg swelling.     DIAGNOSTICS:     EKG: appears normal, NSR, normal axis, normal intervals, no acute ST/T changes c/w ischemia, no LVH by voltage criteria, there are no prior tracings available  Labs Drawn and in Process:   Unresulted Labs Ordered in the Past 30 Days of this Admission     No orders found from 2/1/2019 to 4/3/2019.          Recent Labs   Lab Test 03/21/19  1704 12/27/18  0816 02/28/18  0949  06/28/16  1020   HGB 10.1*  --   --   --  13.2     --   --   --  212   NA  --  134 137   < > 136   POTASSIUM  --  3.4 3.7   < > 3.6   CR  --  0.84 0.83   < > 0.77   A1C 6.6*  --  6.3*  --   --     < > = values in this interval not displayed.        IMPRESSION:   Reason for surgery/procedure: infected sebaceous cyst.     The proposed surgical procedure is considered INTERMEDIATE risk.    REVISED CARDIAC RISK INDEX  The patient has the following serious cardiovascular risks for perioperative complications such as (MI, PE, VFib and 3  AV Block):  No serious cardiac risks  INTERPRETATION: 0 risks: Class I (very low risk - 0.4% complication rate)    The patient has the following additional risks for perioperative  complications:  No identified additional risks      ICD-10-CM    1. Preop general physical exam Z01.818 CBC with platelets differential     Basic metabolic panel     EKG 12-lead complete w/read - Clinics   2. Infected sebaceous cyst L72.3     L08.9    3. Seasonal allergic rhinitis, unspecified trigger J30.2 fluticasone (FLONASE) 50 MCG/ACT nasal spray   4. Hypertension goal BP (blood pressure) < 150/90 I10        RECOMMENDATIONS:     APPROVAL GIVEN to proceed with proposed procedure, without further diagnostic evaluation   potassium was low and started potassium and not in normal range. Ok for surgery.     Signed Electronically by: Emery Kuhn PA-C    Copy of this evaluation report is provided to requesting physician.    [facility] Preop Guidelines    Revised Cardiac Risk Index  I agree with the above plan  Remy Prado MD

## 2019-04-03 ENCOUNTER — TELEPHONE (OUTPATIENT)
Dept: FAMILY MEDICINE | Facility: CLINIC | Age: 84
End: 2019-04-03

## 2019-04-03 DIAGNOSIS — D64.9 ANEMIA, UNSPECIFIED TYPE: ICD-10-CM

## 2019-04-03 RX ORDER — FLUTICASONE PROPIONATE 50 MCG
SPRAY, SUSPENSION (ML) NASAL
Qty: 48 ML | Refills: 2 | Status: SHIPPED | OUTPATIENT
Start: 2019-04-03

## 2019-04-03 RX ORDER — POTASSIUM CHLORIDE 1500 MG/1
20 TABLET, EXTENDED RELEASE ORAL 3 TIMES DAILY
Qty: 30 TABLET | Refills: 0 | Status: SHIPPED | OUTPATIENT
Start: 2019-04-03 | End: 2019-04-24

## 2019-04-03 RX ORDER — CLINDAMYCIN HCL 150 MG
150 CAPSULE ORAL 3 TIMES DAILY
Qty: 30 CAPSULE | Refills: 0 | Status: SHIPPED | OUTPATIENT
Start: 2019-04-03 | End: 2019-04-24

## 2019-04-03 NOTE — TELEPHONE ENCOUNTER
Patient due for potassium to be done and he has a lab appointment tomorrow 4/4/19.    Will hold refill until this result is finalized.    Marybeth Oquendo RN, BSN

## 2019-04-03 NOTE — TELEPHONE ENCOUNTER
Called and spoke with Flores.  Made her aware of her lab results and Emery Kuhn PA-C's instructions.  She states will call and have one of her children  her prescription's right away.  Aware needs to get the 3 doses of potassium in today.  Is aware of need to take an antibiotic and will start that also.  Lab appointment made at  [facility] [place] (close to her home) for tomorrow at 11am.  She states will make sure she gets to that appointment.  Aware we will be calling her tomorrow with the lab results; surgery is Friday.  Vale Ortega RN

## 2019-04-03 NOTE — TELEPHONE ENCOUNTER
Please call Flores Cuello with abnormal results:   Low potassium: Prescription was sent to pharmacy.  Will need labs again tomorrow.     Also elevated WBC's. Prescription was sent to pharmacy for antibiotics.    Emery Kuhn PA-C    Discussed with Dr. Prado

## 2019-04-03 NOTE — TELEPHONE ENCOUNTER
Routing refill request to provider for review/approval because:  Labs not current:  Potassium has not been done in the past 12 months.    No appointment pending at this time.  Routing to provider to advise.    Marybeth Oquendo RN, BSN

## 2019-04-04 DIAGNOSIS — D64.9 ANEMIA, UNSPECIFIED TYPE: ICD-10-CM

## 2019-04-04 LAB
BASOPHILS # BLD AUTO: 0 10E9/L (ref 0–0.2)
BASOPHILS NFR BLD AUTO: 0.2 %
DIFFERENTIAL METHOD BLD: ABNORMAL
EOSINOPHIL # BLD AUTO: 0 10E9/L (ref 0–0.7)
EOSINOPHIL NFR BLD AUTO: 0.2 %
ERYTHROCYTE [DISTWIDTH] IN BLOOD BY AUTOMATED COUNT: 16.4 % (ref 10–15)
FERRITIN SERPL-MCNC: 157 NG/ML (ref 8–252)
FOLATE SERPL-MCNC: 45.6 NG/ML
HCT VFR BLD AUTO: 29.6 % (ref 35–47)
HGB BLD-MCNC: 9.4 G/DL (ref 11.7–15.7)
IRON SATN MFR SERPL: 9 % (ref 15–46)
IRON SERPL-MCNC: 19 UG/DL (ref 35–180)
LYMPHOCYTES # BLD AUTO: 1.6 10E9/L (ref 0.8–5.3)
LYMPHOCYTES NFR BLD AUTO: 9.2 %
MCH RBC QN AUTO: 28.7 PG (ref 26.5–33)
MCHC RBC AUTO-ENTMCNC: 31.8 G/DL (ref 31.5–36.5)
MCV RBC AUTO: 91 FL (ref 78–100)
MONOCYTES # BLD AUTO: 1.9 10E9/L (ref 0–1.3)
MONOCYTES NFR BLD AUTO: 11.2 %
NEUTROPHILS # BLD AUTO: 13.4 10E9/L (ref 1.6–8.3)
NEUTROPHILS NFR BLD AUTO: 79.2 %
PLATELET # BLD AUTO: 408 10E9/L (ref 150–450)
POTASSIUM SERPL-SCNC: 4.2 MMOL/L (ref 3.4–5.3)
RBC # BLD AUTO: 3.27 10E12/L (ref 3.8–5.2)
RETICS # AUTO: 70.6 10E9/L (ref 25–95)
RETICS/RBC NFR AUTO: 2.1 % (ref 0.5–2)
TIBC SERPL-MCNC: 210 UG/DL (ref 240–430)
VIT B12 SERPL-MCNC: 1324 PG/ML (ref 193–986)
WBC # BLD AUTO: 16.9 10E9/L (ref 4–11)

## 2019-04-04 PROCEDURE — 83540 ASSAY OF IRON: CPT | Performed by: PHYSICIAN ASSISTANT

## 2019-04-04 PROCEDURE — 82607 VITAMIN B-12: CPT | Performed by: PHYSICIAN ASSISTANT

## 2019-04-04 PROCEDURE — 84132 ASSAY OF SERUM POTASSIUM: CPT | Performed by: PHYSICIAN ASSISTANT

## 2019-04-04 PROCEDURE — 85025 COMPLETE CBC W/AUTO DIFF WBC: CPT | Performed by: PHYSICIAN ASSISTANT

## 2019-04-04 PROCEDURE — 83550 IRON BINDING TEST: CPT | Performed by: PHYSICIAN ASSISTANT

## 2019-04-04 PROCEDURE — 82728 ASSAY OF FERRITIN: CPT | Performed by: PHYSICIAN ASSISTANT

## 2019-04-04 PROCEDURE — 85060 BLOOD SMEAR INTERPRETATION: CPT | Performed by: PHYSICIAN ASSISTANT

## 2019-04-04 PROCEDURE — 85045 AUTOMATED RETICULOCYTE COUNT: CPT | Performed by: PHYSICIAN ASSISTANT

## 2019-04-04 PROCEDURE — 82746 ASSAY OF FOLIC ACID SERUM: CPT | Performed by: PHYSICIAN ASSISTANT

## 2019-04-04 PROCEDURE — 36415 COLL VENOUS BLD VENIPUNCTURE: CPT | Performed by: PHYSICIAN ASSISTANT

## 2019-04-05 ENCOUNTER — TRANSFERRED RECORDS (OUTPATIENT)
Dept: HEALTH INFORMATION MANAGEMENT | Facility: CLINIC | Age: 84
End: 2019-04-05

## 2019-04-05 RX ORDER — POTASSIUM CHLORIDE 1500 MG/1
TABLET, EXTENDED RELEASE ORAL
Qty: 270 TABLET | Refills: 0 | OUTPATIENT
Start: 2019-04-05

## 2019-04-06 LAB
COPATH REPORT: NORMAL
CREAT SERPL-MCNC: 1.02 MG/DL (ref 0.55–1.02)
GFR SERPL CREATININE-BSD FRML MDRD: 52 ML/MIN
GLUCOSE SERPL-MCNC: 219 MG/DL (ref 70–110)
POTASSIUM SERPL-SCNC: 3.8 MMOL/L (ref 3.5–5.1)

## 2019-04-09 ENCOUNTER — NURSE TRIAGE (OUTPATIENT)
Dept: NURSING | Facility: CLINIC | Age: 84
End: 2019-04-09

## 2019-04-09 ENCOUNTER — TELEPHONE (OUTPATIENT)
Dept: SURGERY | Facility: CLINIC | Age: 84
End: 2019-04-09

## 2019-04-09 NOTE — TELEPHONE ENCOUNTER
Output has been 15-20cc every 5-7 hours. 0145 15cc  1000 18cc  1730 85cc  1750 20cc  1830 15cc  Color is the same serosanguenous. No odor. Last night there was a clump in it. No increased pain. No redness or warmth. No fever or chills. Daughter is concerned and would like to speak with provider. On call provider paged.    Johanna Godinez RN  [facility]        JONAH

## 2019-04-09 NOTE — TELEPHONE ENCOUNTER
Pt s/p wide local excision lt groin mass & lt breast core needle bx 4/5/19  Pt's daughter reports pt's incision site is slightly open with some yellowish creamy drainage  Denies redness, swelling, warmth, pain, fever, chills  Pt has been changing the dressing 3-5 x daily  RAUDEL drain has serosanguinous drainage   Daughter is worried because pt's incision site does not look any better than it did in the hospital  She is concerned about waiting until 4/17/19 for post-op apt  Scheduled pt an apt for 4/10/19 with Dr. Packer  Daughter agreed w/this plan and has no additional questions    Dixon Malone RN....4/9/2019 8:38 AM

## 2019-04-09 NOTE — TELEPHONE ENCOUNTER
Reason for Call:  Other call back    Detailed comments: Lora stating that the amount of drainage out of the tube on the patient has increased significantly. Please advise.     Phone Number Patient can be reached at: Home number on file [phone] (home)    Best Time: any     Can we leave a detailed message on this number? YES    Call taken on 4/9/2019 at 6:17 PM by Jhoan Zamorano

## 2019-04-09 NOTE — TELEPHONE ENCOUNTER
Daughter calling. There is a milky discharge from the 14 inch incision. No fever. Please call to discuss.

## 2019-04-10 ENCOUNTER — OFFICE VISIT (OUTPATIENT)
Dept: SURGERY | Facility: CLINIC | Age: 84
End: 2019-04-10
Payer: COMMERCIAL

## 2019-04-10 VITALS
HEART RATE: 78 BPM | WEIGHT: 171 LBS | BODY MASS INDEX: 34.47 KG/M2 | HEIGHT: 59 IN | DIASTOLIC BLOOD PRESSURE: 76 MMHG | SYSTOLIC BLOOD PRESSURE: 123 MMHG

## 2019-04-10 DIAGNOSIS — C79.89 METASTATIC SQUAMOUS CELL CARCINOMA INVOLVING SOFT TISSUE WITH UNKNOWN PRIMARY SITE (H): Primary | ICD-10-CM

## 2019-04-10 DIAGNOSIS — C80.1 METASTATIC SQUAMOUS CELL CARCINOMA INVOLVING SOFT TISSUE WITH UNKNOWN PRIMARY SITE (H): Primary | ICD-10-CM

## 2019-04-10 PROCEDURE — 99024 POSTOP FOLLOW-UP VISIT: CPT | Performed by: SURGERY

## 2019-04-10 ASSESSMENT — MIFFLIN-ST. JEOR: SCORE: 1131.53

## 2019-04-10 NOTE — LETTER
"    4/10/2019         RE: Flores Cuello  [address] [facility]  [place] [address]        Dear Colleague,    Thank you for referring your patient, Flores Cuello, to the [facility]. Please see a copy of my visit note below.    General Surgery Post Op    Pt returns for follow up visit s/p resection of large left groin mass, core needle biopsy left breast on 4/5/19.    Patient has been doing well, no pain. Using tylenol. Daughter had concern about part of incision so made appointment to have looked at. This area seems to be the same since the hospital stay. No spreading redness or purulent drainage. There is serous seepage from wound and in the RAUDEL drain. They area recording outputs.  Left leg remains edematous    Physical exam: Vitals: /76   Pulse 78   Ht 1.499 m (4' 11.02\")   Wt 77.6 kg (171 lb)   BMI 34.52 kg/m     BMI= Body mass index is 34.52 kg/m .    Exam:  Constitutional: healthy, alert and no distress  Skin: Left groin incision intact, there is moisture in area from tissue edema coming through. Slight redness just along incision itself appears as normal skin irritation not cellulitis. No skin edge necrosis. RAUDEL bulb with clear serous fluid. Area of family concern appears to just be a slightly more open area with some fibrinous debris.    Path:  Final Diagnosis A. Skin and soft tissue, left groin mass, wide local excision-Squamous cell carcinoma with involvement of deep margin, favor metastatic (see comment).  P16 immunostain is positive.  B. Left breast, inferior, needle core biopsy-Fat necrosis and hemorrhage consistent with hematoma/cyst. Negative for malignancy.     Comment The left groin mass is represented by squamous cell carcinoma with extensive necrosis. There is no demonstrable connection to the overlying skin and the architecture is indicative of a metastatic process. Please correlate with clinical and imaging data.          Assessment:     ICD-10-CM    1. 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 Discussed that with all the edema in the left leg and thigh that this seeping through can be expected and to keep dry with absorbent gauze, change as needed. We will keep RAUDEL in place as outputs still high. No concern for infection at this time.  Went over pathology results with patient and daughter. Appears as metastatic squamous cell carcinoma. They have not noticed any concerning skin lesions, and I did not note anything obvious in the OR (did not see back however). I am not surprised there was positive deep margin microscopically as this dissection was right on the femoral vein and getting very close to femoral artery. All gross disease had been resected however.  I will refer on to oncology for their opinion on any further testing and treatment. The breast core needle biopsy showed likely cyst tissue which they do have in the family. Hard to know for sure if the area biopsies corresponds to that seen on CT, but it was what I was able to target by palpation. Did discuss that image guided biopsy may still be needed if there is further concern there.  No further questions or concerns from patient or daughter. We will follow up next at our regular post op visit next week.    Reynaldo Packer MD      Again, thank you for allowing me to participate in the care of your patient.        Sincerely,        Reynaldo Packer MD

## 2019-04-10 NOTE — TELEPHONE ENCOUNTER
Talked to daughter and assured her if not getting more painful or redness and not blood coming out then just continue to watch if the above happen then go to urgent care or just follow up with Dr. Packer this am as they already have an appointment with him set up.   Baron Bland MD

## 2019-04-10 NOTE — PROGRESS NOTES
"General Surgery Post Op    Pt returns for follow up visit s/p resection of large left groin mass, core needle biopsy left breast on 4/5/19.    Patient has been doing well, no pain. Using tylenol. Daughter had concern about part of incision so made appointment to have looked at. This area seems to be the same since the hospital stay. No spreading redness or purulent drainage. There is serous seepage from wound and in the RAUDEL drain. They area recording outputs.  Left leg remains edematous    Physical exam: Vitals: /76   Pulse 78   Ht 1.499 m (4' 11.02\")   Wt 77.6 kg (171 lb)   BMI 34.52 kg/m    BMI= Body mass index is 34.52 kg/m .    Exam:  Constitutional: healthy, alert and no distress  Skin: Left groin incision intact, there is moisture in area from tissue edema coming through. Slight redness just along incision itself appears as normal skin irritation not cellulitis. No skin edge necrosis. RAUDEL bulb with clear serous fluid. Area of family concern appears to just be a slightly more open area with some fibrinous debris.    Path:  Final Diagnosis A. Skin and soft tissue, left groin mass, wide local excision-Squamous cell carcinoma with involvement of deep margin, favor metastatic (see comment).  P16 immunostain is positive.  B. Left breast, inferior, needle core biopsy-Fat necrosis and hemorrhage consistent with hematoma/cyst. Negative for malignancy.     Comment The left groin mass is represented by squamous cell carcinoma with extensive necrosis. There is no demonstrable connection to the overlying skin and the architecture is indicative of a metastatic process. Please correlate with clinical and imaging data.          Assessment:     ICD-10-CM    1. 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 Discussed that with all the edema in the left leg and thigh that this seeping " through can be expected and to keep dry with absorbent gauze, change as needed. We will keep RAUDEL in place as outputs still high. No concern for infection at this time.  Went over pathology results with patient and daughter. Appears as metastatic squamous cell carcinoma. They have not noticed any concerning skin lesions, and I did not note anything obvious in the OR (did not see back however). I am not surprised there was positive deep margin microscopically as this dissection was right on the femoral vein and getting very close to femoral artery. All gross disease had been resected however.  I will refer on to oncology for their opinion on any further testing and treatment. The breast core needle biopsy showed likely cyst tissue which they do have in the family. Hard to know for sure if the area biopsies corresponds to that seen on CT, but it was what I was able to target by palpation. Did discuss that image guided biopsy may still be needed if there is further concern there.  No further questions or concerns from patient or daughter. We will follow up next at our regular post op visit next week.    Reynaldo Packer MD

## 2019-04-10 NOTE — TELEPHONE ENCOUNTER
Received patient's permission to speak to Lora.  Lora calls back and says on-call surgeon has not call her yet.  She still wants to speak to Dr. Nguyen, surgeon's team.  See previous TE from today for reason.  FNA advised patient to phone back FNA in 20 minutes if no response from on call MD and patient agreed.  Second page to [facility] On-call surgeon, Dr. Nguyen at 7:19pm to call her back re: increase drainage.       Reason for Disposition    [1] Caller requests to speak ONLY to PCP AND [2] URGENT question    Additional Information    Negative: Lab calling with strep throat test results and triager can call in prescription    Negative: Lab calling with urinalysis test results and triager can call in prescription    Negative: Medication questions    Negative: ED call to PCP    Negative: Physician call to PCP    Negative: Call about patient who is currently hospitalized    Negative: Lab or radiology calling with CRITICAL test results    Negative: [1] Prescription not at pharmacy AND [2] was prescribed today by PCP    Negative: [1] Follow-up call from patient regarding patient's clinical status AND [2] information urgent    Protocols used: PCP CALL - NO TRIAGE-ADULT-

## 2019-04-11 NOTE — TELEPHONE ENCOUNTER
RECORDS STATUS - ALL OTHER DIAGNOSIS      NO OUTSIDE RECORDS - INTERNAL REFERRAL    RECORDS RECEIVED FROM: Internal Referral   DATE RECEIVED: NA   NOTES STATUS DETAILS   OFFICE NOTE from referring provider     OFFICE NOTE from medical oncologist     DISCHARGE SUMMARY from hospital     DISCHARGE REPORT from the ER     OPERATIVE REPORT     MEDICATION LIST     CLINICAL TRIAL TREATMENTS TO DATE     LABS     PATHOLOGY REPORTS     ANYTHING RELATED TO DIAGNOSIS     GENONOMIC TESTING     TYPE:     IMAGING (NEED IMAGES & REPORT)     CT SCANS     MRI     MAMMO     ULTRASOUND     PET

## 2019-04-11 NOTE — TELEPHONE ENCOUNTER
ONCOLOGY INTAKE: Records Information      APPT INFORMATION:  Referring provider:  Reynaldo Packer  Referring provider s clinic:  [facility]  Reason for visit/diagnosis:  Metastatic SCC/Unknown primary site    RECORDS INFORMATION:  Were the records received with the referral (via [employer])? No - Internal Referral    Has patient been seen for any external appt for this diagnosis? No    If yes, where? NA    Has patient had any imaging or procedures outside of [facility] for this condition? No      If Yes, where? NA    ADDITIONAL INFORMATION:  Per PT's daughter, no outside records.

## 2019-04-17 ENCOUNTER — OFFICE VISIT (OUTPATIENT)
Dept: SURGERY | Facility: CLINIC | Age: 84
End: 2019-04-17
Payer: COMMERCIAL

## 2019-04-17 VITALS
HEIGHT: 59 IN | WEIGHT: 171 LBS | SYSTOLIC BLOOD PRESSURE: 118 MMHG | HEART RATE: 92 BPM | DIASTOLIC BLOOD PRESSURE: 68 MMHG | BODY MASS INDEX: 34.47 KG/M2

## 2019-04-17 DIAGNOSIS — C80.1 METASTATIC SQUAMOUS CELL CARCINOMA INVOLVING SOFT TISSUE WITH UNKNOWN PRIMARY SITE (H): Primary | ICD-10-CM

## 2019-04-17 DIAGNOSIS — L03.116 CELLULITIS OF LEFT LOWER EXTREMITY: ICD-10-CM

## 2019-04-17 DIAGNOSIS — C79.89 METASTATIC SQUAMOUS CELL CARCINOMA INVOLVING SOFT TISSUE WITH UNKNOWN PRIMARY SITE (H): Primary | ICD-10-CM

## 2019-04-17 PROCEDURE — 99024 POSTOP FOLLOW-UP VISIT: CPT | Performed by: SURGERY

## 2019-04-17 RX ORDER — CLINDAMYCIN HCL 300 MG
300 CAPSULE ORAL 3 TIMES DAILY
Qty: 30 CAPSULE | Refills: 0 | Status: SHIPPED | OUTPATIENT
Start: 2019-04-17 | End: 2019-04-27

## 2019-04-17 ASSESSMENT — MIFFLIN-ST. JEOR: SCORE: 1131.53

## 2019-04-17 NOTE — LETTER
"    4/17/2019         RE: Flores Cuello  [address] [facility]  [place] [address]        Dear Colleague,    Thank you for referring your patient, Flores Cuello, to the [facility]. Please see a copy of my visit note below.    General Surgery Post Op    Pt returns for follow up visit s/p resection left groin mass on 4/5/19.    Patient has been doing well, still not much pain. Drain outputs recorded- had been about 100cc a day until recently seems to be dropping down. Recorded about 30 ml in past 12 hrs. Has remained clear yellow. Still drainage from incision as well, mostly medially in a crease. Left leg edema has started to improve. Antibiotics finished few days ago, once that was done started noticing some red rash on left leg mostly on back    Physical exam: Vitals: /68   Pulse 92   Ht 1.499 m (4' 11.02\")   Wt 77.6 kg (171 lb)   BMI 34.52 kg/m     BMI= Body mass index is 34.52 kg/m .    Exam:  Constitutional: healthy, alert and no distress  Skin: Incision intact, small area with serous drainage medially. Centrally the skin edges had  about 1 cm but the subcutaneous remained intact. Appears to be healing fine  Left leg still edematous though does appear to have some improvement compared to previous.  There is red rash along posterior of thigh into calf, some also on medial and lateral sides. Area is warm but not tender. Blanches with pressure. This does not seem to involve area of wound    Path:  Squamous cell carcinoma appears metastatic    Assessment:     ICD-10-CM    1. 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 Cellulitis of left lower extremity L03.116 clindamycin (CLEOCIN) 300 MG capsule     Plan: Appears likely as cellulitis and since appeared after antibiotics finished will go ahead and add back on. She was tolerating it well before. RAUDEL drain removed as output decreasing and worried about infection " risk to leave longer. Advised what to watch for if developing seroma. Keep area of incision dry with absorbent dressings, change as needed. Follow up with me in 2 weeks to recheck, sooner if concerns. They have appointment with Dr Pham in oncology beginning of may.    Reynaldo Packer MD      Again, thank you for allowing me to participate in the care of your patient.        Sincerely,        Reynaldo Packer MD

## 2019-04-17 NOTE — PROGRESS NOTES
"General Surgery Post Op    Pt returns for follow up visit s/p resection left groin mass on 4/5/19.    Patient has been doing well, still not much pain. Drain outputs recorded- had been about 100cc a day until recently seems to be dropping down. Recorded about 30 ml in past 12 hrs. Has remained clear yellow. Still drainage from incision as well, mostly medially in a crease. Left leg edema has started to improve. Antibiotics finished few days ago, once that was done started noticing some red rash on left leg mostly on back    Physical exam: Vitals: /68   Pulse 92   Ht 1.499 m (4' 11.02\")   Wt 77.6 kg (171 lb)   BMI 34.52 kg/m    BMI= Body mass index is 34.52 kg/m .    Exam:  Constitutional: healthy, alert and no distress  Skin: Incision intact, small area with serous drainage medially. Centrally the skin edges had  about 1 cm but the subcutaneous remained intact. Appears to be healing fine  Left leg still edematous though does appear to have some improvement compared to previous.  There is red rash along posterior of thigh into calf, some also on medial and lateral sides. Area is warm but not tender. Blanches with pressure. This does not seem to involve area of wound    Path:  Squamous cell carcinoma appears metastatic    Assessment:     ICD-10-CM    1. 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 Cellulitis of left lower extremity L03.116 clindamycin (CLEOCIN) 300 MG capsule     Plan: Appears likely as cellulitis and since appeared after antibiotics finished will go ahead and add back on. She was tolerating it well before. RAUDEL drain removed as output decreasing and worried about infection risk to leave longer. Advised what to watch for if developing seroma. Keep area of incision dry with absorbent dressings, change as needed. Follow up with me in 2 weeks to recheck, sooner if concerns. They have appointment with Dr Pham in oncology " beginning of may.    Reynaldo Packer MD

## 2019-04-22 ENCOUNTER — TELEPHONE (OUTPATIENT)
Dept: FAMILY MEDICINE | Facility: CLINIC | Age: 84
End: 2019-04-22

## 2019-04-22 ENCOUNTER — TELEPHONE (OUTPATIENT)
Dept: SURGERY | Facility: CLINIC | Age: 84
End: 2019-04-22

## 2019-04-22 DIAGNOSIS — I10 BENIGN ESSENTIAL HYPERTENSION: ICD-10-CM

## 2019-04-22 DIAGNOSIS — E87.6 LOW BLOOD POTASSIUM: Primary | ICD-10-CM

## 2019-04-22 DIAGNOSIS — I10 HYPERTENSION GOAL BP (BLOOD PRESSURE) < 150/90: ICD-10-CM

## 2019-04-22 RX ORDER — METOPROLOL SUCCINATE 50 MG/1
TABLET, EXTENDED RELEASE ORAL
Qty: 30 TABLET | Refills: 0 | OUTPATIENT
Start: 2019-04-22

## 2019-04-22 NOTE — TELEPHONE ENCOUNTER
Patient is s/p resection left groin mass on 4/5/19. Called and spoke with patient's daughter, Flores, who reports patient developed an area of swelling that is hard below the incision site 3 days ago. Denies redness. Incision site is intact. Still has some clear yellow drainage however has reduced in amount since office visit 5 days ago. Patient woke up feeling hot a few days ago however has not felt that way since. Patient is taking Tylenol q6hrs and Clindamycin TID. Red rash that was previously present posteriorly on thigh into has has resolved. Patient has also been having heartburn over the past week. RN recommended continued monitoring and eating yogurt intermittently throughout the day and getting omeprazole if not improved. Also recommended scheduling an appointment for 4/24/19 if they are concerned. Daughter verbalized understanding and has no other questions.    Dixon Malone RN....4/22/2019 11:06 AM

## 2019-04-22 NOTE — TELEPHONE ENCOUNTER
Daughter requesting orders for home care.  RN, HHA, PHYSICAL Therapy. 1.Requesting physical therapy eval and treat; still has some left leg edema/weakness. Is up and walking independently without cane/walker.  Can get out of bed independently.   2. RN visit/assessment; has long incision left leg hip into groin area.  Mostly healed.  Small area still draining.  Daughter has been doing dressing changes 3x/day;  States using gauze pads, non stick pads, and neosporin.  She was started on antibiotic again 4/17 per Dr. Packer.  Flores is able to do the dressing changes on own but daughter feels needs RN to assess wound for signs of infection/healing.  3. Requesting home health aid to assist with bathing, meal prep, and light house keeping needs.  She is able to dress self independently.   Patient had surgery April 5th Dr. Packer for left groin mass.  Sees oncology May 3, metastatic squamous cell carcinoma involving soft tissue.    Daughter has been staying with patient (and patient's spouse) x 2 weeks and assisting.  Will be going back to work today and is requesting home care.  She states was told by Dr. Packer to speak with pcp regarding.   Daughter states leaves home at this time only for MD appts.    4.  Wondering if she still needs to continue the potassium supplement.  Had been put on it due to low potassium at pre-op.  Has just finished it.  Had had potassium rechecked prior to surgery and was normal.    Potassium   Date Value Ref Range Status   04/04/2019 4.2 3.4 - 5.3 mmol/L Final     Please advise and place home care orders.  Send back to team to notify daughter either way.   Vale Ortega RN    Daughter will make sure her mother signs consent to share information when at clinic next.  Aware at this time I can take information but cannot give any to her.  Vale Ortega RN

## 2019-04-22 NOTE — TELEPHONE ENCOUNTER
Ok for home care.   Does she need to be seen by me for this?     She need potassium levels check within in 1 wk.     Emery Kuhn PA-C

## 2019-04-22 NOTE — TELEPHONE ENCOUNTER
Daughter calling. There is an area below the incision that she would like to discuss the swelling. Please call her.

## 2019-04-24 ENCOUNTER — ANCILLARY PROCEDURE (OUTPATIENT)
Dept: GENERAL RADIOLOGY | Facility: CLINIC | Age: 84
End: 2019-04-24
Attending: PHYSICIAN ASSISTANT
Payer: COMMERCIAL

## 2019-04-24 ENCOUNTER — OFFICE VISIT (OUTPATIENT)
Dept: FAMILY MEDICINE | Facility: CLINIC | Age: 84
End: 2019-04-24
Payer: COMMERCIAL

## 2019-04-24 VITALS
OXYGEN SATURATION: 96 % | HEART RATE: 100 BPM | DIASTOLIC BLOOD PRESSURE: 74 MMHG | TEMPERATURE: 97.8 F | SYSTOLIC BLOOD PRESSURE: 135 MMHG | RESPIRATION RATE: 18 BRPM | BODY MASS INDEX: 33.47 KG/M2 | WEIGHT: 166 LBS | HEIGHT: 59 IN

## 2019-04-24 DIAGNOSIS — D64.9 ANEMIA, UNSPECIFIED TYPE: ICD-10-CM

## 2019-04-24 DIAGNOSIS — E87.6 LOW BLOOD POTASSIUM: ICD-10-CM

## 2019-04-24 DIAGNOSIS — Z51.89 ENCOUNTER FOR WOUND CARE: ICD-10-CM

## 2019-04-24 DIAGNOSIS — Z79.4 TYPE 2 DIABETES MELLITUS WITHOUT COMPLICATION, WITH LONG-TERM CURRENT USE OF INSULIN (H): ICD-10-CM

## 2019-04-24 DIAGNOSIS — E11.9 TYPE 2 DIABETES MELLITUS WITHOUT COMPLICATION, WITH LONG-TERM CURRENT USE OF INSULIN (H): ICD-10-CM

## 2019-04-24 DIAGNOSIS — R21 RASH: ICD-10-CM

## 2019-04-24 DIAGNOSIS — R05.9 COUGH: ICD-10-CM

## 2019-04-24 DIAGNOSIS — G25.81 RESTLESS LEG SYNDROME: ICD-10-CM

## 2019-04-24 DIAGNOSIS — R53.83 FATIGUE, UNSPECIFIED TYPE: ICD-10-CM

## 2019-04-24 DIAGNOSIS — J90 PLEURAL EFFUSION: ICD-10-CM

## 2019-04-24 LAB
ANION GAP SERPL CALCULATED.3IONS-SCNC: 9 MMOL/L (ref 3–14)
BASOPHILS # BLD AUTO: 0.1 10E9/L (ref 0–0.2)
BASOPHILS NFR BLD AUTO: 0.4 %
BUN SERPL-MCNC: 24 MG/DL (ref 7–30)
CALCIUM SERPL-MCNC: 9.2 MG/DL (ref 8.5–10.1)
CHLORIDE SERPL-SCNC: 98 MMOL/L (ref 94–109)
CO2 SERPL-SCNC: 25 MMOL/L (ref 20–32)
CREAT SERPL-MCNC: 0.94 MG/DL (ref 0.52–1.04)
DIFFERENTIAL METHOD BLD: ABNORMAL
EOSINOPHIL # BLD AUTO: 0.1 10E9/L (ref 0–0.7)
EOSINOPHIL NFR BLD AUTO: 0.7 %
ERYTHROCYTE [DISTWIDTH] IN BLOOD BY AUTOMATED COUNT: 17.4 % (ref 10–15)
GFR SERPL CREATININE-BSD FRML MDRD: 55 ML/MIN/{1.73_M2}
GLUCOSE SERPL-MCNC: 129 MG/DL (ref 70–99)
HCT VFR BLD AUTO: 29.7 % (ref 35–47)
HGB BLD-MCNC: 9.3 G/DL (ref 11.7–15.7)
LYMPHOCYTES # BLD AUTO: 1.9 10E9/L (ref 0.8–5.3)
LYMPHOCYTES NFR BLD AUTO: 12.3 %
MCH RBC QN AUTO: 28.6 PG (ref 26.5–33)
MCHC RBC AUTO-ENTMCNC: 31.3 G/DL (ref 31.5–36.5)
MCV RBC AUTO: 91 FL (ref 78–100)
MONOCYTES # BLD AUTO: 1.7 10E9/L (ref 0–1.3)
MONOCYTES NFR BLD AUTO: 10.8 %
NEUTROPHILS # BLD AUTO: 11.9 10E9/L (ref 1.6–8.3)
NEUTROPHILS NFR BLD AUTO: 75.8 %
PLATELET # BLD AUTO: 540 10E9/L (ref 150–450)
POTASSIUM SERPL-SCNC: 4 MMOL/L (ref 3.4–5.3)
RBC # BLD AUTO: 3.25 10E12/L (ref 3.8–5.2)
SODIUM SERPL-SCNC: 132 MMOL/L (ref 133–144)
WBC # BLD AUTO: 15.7 10E9/L (ref 4–11)

## 2019-04-24 PROCEDURE — 99214 OFFICE O/P EST MOD 30 MIN: CPT | Performed by: PHYSICIAN ASSISTANT

## 2019-04-24 PROCEDURE — 80048 BASIC METABOLIC PNL TOTAL CA: CPT | Performed by: PHYSICIAN ASSISTANT

## 2019-04-24 PROCEDURE — 85025 COMPLETE CBC W/AUTO DIFF WBC: CPT | Performed by: PHYSICIAN ASSISTANT

## 2019-04-24 PROCEDURE — 36415 COLL VENOUS BLD VENIPUNCTURE: CPT | Performed by: PHYSICIAN ASSISTANT

## 2019-04-24 PROCEDURE — 71046 X-RAY EXAM CHEST 2 VIEWS: CPT

## 2019-04-24 RX ORDER — CLOTRIMAZOLE 1 %
CREAM (GRAM) TOPICAL 2 TIMES DAILY
Qty: 60 G | Refills: 0 | Status: SHIPPED | OUTPATIENT
Start: 2019-04-24

## 2019-04-24 RX ORDER — GABAPENTIN 100 MG/1
100 CAPSULE ORAL AT BEDTIME
Qty: 30 CAPSULE | Refills: 0 | Status: SHIPPED | OUTPATIENT
Start: 2019-04-24

## 2019-04-24 ASSESSMENT — MIFFLIN-ST. JEOR: SCORE: 1112.56

## 2019-04-24 NOTE — PROGRESS NOTES
SUBJECTIVE:                                                    Flores Cuello is a 84 year old female who presents to clinic today for the following health issues:    Pt here to discuss getting home care set up    Just had large cyst removed and found to have Metastatic squamous cell carcinoma. Has seen surgeon for follow up  And currently on clindamycin.  Here today with her daughter. They would like home care to help with her situation.     Also have noticed rash in buttock. Tx: dry pad bandage.     Also few weeks of dry cough. Doesn't feel ill. Tx: none    Also weeks of restless legs and can't sleep.     Also increased fatigue since surgery. History of anemia.       Also history of low potassium and due for recheck. Just finish potassium medication.     Problem list and histories reviewed & adjusted, as indicated.  Additional history: as documented    Patient Active Problem List   Diagnosis     Impaired fasting glucose     Advance directive discussed with patient     Hyperlipidemia LDL goal <130     High cholesterol     Benign essential hypertension     Hypertension goal BP (blood pressure) < 150/90     Type 2 diabetes mellitus without complication, with long-term current use of insulin (H)     Restless leg syndrome     Cough     Anemia, unspecified type     Low blood potassium     Metastatic squamous cell carcinoma (H)     Past Surgical History:   Procedure Laterality Date      SECTION        SECTION         Social History     Tobacco Use     Smoking status: Never Smoker     Smokeless tobacco: Never Used   Substance Use Topics     Alcohol use: No     History reviewed. No pertinent family history.      Current Outpatient Medications   Medication Sig Dispense Refill     albuterol (PROAIR HFA, PROVENTIL HFA, VENTOLIN HFA) 108 (90 BASE) MCG/ACT inhaler Inhale 2 puffs into the lungs every 6 hours as needed for shortness of breath / dyspnea or wheezing 1 Inhaler 1     Cholecalciferol  "(VITAMIN D-3 PO) Take by mouth daily       clindamycin (CLEOCIN) 300 MG capsule Take 1 capsule (300 mg) by mouth 3 times daily for 10 days 30 capsule 0     clotrimazole (LOTRIMIN) 1 % external cream Apply topically 2 times daily 60 g 0     COD LIVER OIL PO Take  by mouth.       fluticasone (FLONASE) 50 MCG/ACT nasal spray SHAKE LIQUID AND USE 2 SPRAYS IN EACH NOSTRIL DAILY AS NEEDED FOR RHINITIS OR ALLERGIES 48 mL 2     gabapentin (NEURONTIN) 100 MG capsule Take 1 capsule (100 mg) by mouth At Bedtime 30 capsule 0     hydrochlorothiazide (HYDRODIURIL) 25 MG tablet TAKE 1 TABLET(25 MG) BY MOUTH DAILY 90 tablet 1     MAGNESIUM OXIDE PO        metoprolol succinate ER (TOPROL-XL) 50 MG 24 hr tablet TAKE 1 TABLET(50 MG) BY MOUTH DAILY 90 tablet 1     Multiple Vitamin (DAILY MULTIVITAMIN PO) Take  by mouth daily.       VITAMIN E daily       aspirin 81 MG chewable tablet Take 1 tablet (81 mg) by mouth daily (Patient not taking: Reported on 4/24/2019) 108 tablet 3     Allergies   Allergen Reactions     Antihistamine [Altaryl]      Palpitations, \"funny in the head/foggy\"     Lisinopril      Patient states \"felt shaky\"     Simvastatin      \"felt shaky\"     Amoxicillin Rash     Penicillins Rash     Recent Labs   Lab Test 04/24/19  1424 04/06/19 03/21/19  1704 12/27/18  0816 02/28/18  0949 07/25/17  0920 06/28/16  1020  07/02/14  0923  09/21/12  0916 08/22/12  0755  06/27/12  0823   A1C  --   --   --  6.6*  --  6.3*  --   --   --  6.0  --   --  5.8   < >  --    LDL  --   --   --   --  106*  --  143* 117*   < >  --    < > 119 137*  --  141*   HDL  --   --   --   --  54  --  55 55   < >  --    < > 48* 48*  --  53   TRIG  --   --   --   --  96  --  136 154*   < >  --    < > 157* 146  --  115   ALT  --   --   --   --   --   --   --   --   --   --   --  19 21  --  15   CR 0.94 1.02   < >  --  0.84 0.83 0.84 0.77   < >  --    < > 0.72 0.76   < > 0.64   GFRESTIMATED 55* 52*   < >  --  64 66 65 72   < >  --    < > 79 74   < > 90 " "  GFRESTBLACK 64 >60   < >  --  74 80 79 87   < >  --    < > >90 89   < > >90   POTASSIUM 4.0 3.8   < >  --  3.4 3.7 3.6 3.6   < >  --    < > 3.7 3.7   < > 4.0   TSH  --   --   --   --   --   --   --  2.34  --   --   --   --   --   --  2.07    < > = values in this interval not displayed.      BP Readings from Last 3 Encounters:   04/24/19 135/74   04/17/19 118/68   04/10/19 123/76    Wt Readings from Last 3 Encounters:   04/24/19 75.3 kg (166 lb)   04/17/19 77.6 kg (171 lb)   04/10/19 77.6 kg (171 lb)                  Labs reviewed in EPIC    ROS:  Constitutional, HEENT, cardiovascular, pulmonary, gi and gu systems are negative, except as otherwise noted.    OBJECTIVE:     /74   Pulse 100   Temp 97.8  F (36.6  C) (Oral)   Resp 18   Ht 1.505 m (4' 11.25\")   Wt 75.3 kg (166 lb)   SpO2 96%   BMI 33.25 kg/m    Body mass index is 33.25 kg/m .  GENERAL: healthy, alert and no distress  NECK: no adenopathy, no asymmetry, masses, or scars and thyroid normal to palpation  RESP: lungs clear to auscultation - no rales, rhonchi or wheezes. Decrease breath sounds RLL.   CV: regular rate and rhythm, normal S1 S2, no S3 or S4, no murmur, click or rub, no peripheral edema and peripheral pulses strong  MS: no gross musculoskeletal defects noted, no edema  SKIN: lower left abdominal wound is healing with whitish granulation tissue. No discharge. Bottom with some superficial erythema. Suspect cutaneous candidiasis.     Diagnostic Test Results:  Results for orders placed or performed in visit on 04/24/19 (from the past 24 hour(s))   Basic metabolic panel   Result Value Ref Range    Sodium 132 (L) 133 - 144 mmol/L    Potassium 4.0 3.4 - 5.3 mmol/L    Chloride 98 94 - 109 mmol/L    Carbon Dioxide 25 20 - 32 mmol/L    Anion Gap 9 3 - 14 mmol/L    Glucose 129 (H) 70 - 99 mg/dL    Urea Nitrogen 24 7 - 30 mg/dL    Creatinine 0.94 0.52 - 1.04 mg/dL    GFR Estimate 55 (L) >60 mL/min/[1.73_m2]    GFR Estimate If Black 64 >60 " mL/min/[1.73_m2]    Calcium 9.2 8.5 - 10.1 mg/dL   CBC with platelets differential   Result Value Ref Range    WBC 15.7 (H) 4.0 - 11.0 10e9/L    RBC Count 3.25 (L) 3.8 - 5.2 10e12/L    Hemoglobin 9.3 (L) 11.7 - 15.7 g/dL    Hematocrit 29.7 (L) 35.0 - 47.0 %    MCV 91 78 - 100 fl    MCH 28.6 26.5 - 33.0 pg    MCHC 31.3 (L) 31.5 - 36.5 g/dL    RDW 17.4 (H) 10.0 - 15.0 %    Platelet Count 540 (H) 150 - 450 10e9/L    % Neutrophils 75.8 %    % Lymphocytes 12.3 %    % Monocytes 10.8 %    % Eosinophils 0.7 %    % Basophils 0.4 %    Absolute Neutrophil 11.9 (H) 1.6 - 8.3 10e9/L    Absolute Lymphocytes 1.9 0.8 - 5.3 10e9/L    Absolute Monocytes 1.7 (H) 0.0 - 1.3 10e9/L    Absolute Eosinophils 0.1 0.0 - 0.7 10e9/L    Absolute Basophils 0.1 0.0 - 0.2 10e9/L    Diff Method Automated Method    XR Chest 2 Views    Narrative    CHEST TWO VIEWS 4/24/2019 2:38 PM     HISTORY: Cough.    COMPARISON: Abdomen and pelvis CT from March 25, 2019.     FINDINGS: Minimal atelectasis and/or fibrosis at the right base.  Nodular densities in the mid right lung and the left base are noted  which are nonspecific. The cardiac silhouette is not enlarged.  Pulmonary vasculature is unremarkable.      Impression    IMPRESSION: Nodular densities are seen in the left base and right  midlung, the ones in the left lower lobe are nodules seen on abdomen  and pelvis CT from March 25, 2019 and likely represent pulmonary  nodules.    EDDIE ANN MD       ASSESSMENT/PLAN:       ICD-10-CM    1. Metastatic squamous cell carcinoma (H) C79.9 HOME CARE NURSING REFERRAL   2. Low blood potassium E87.6 Basic metabolic panel   3. Anemia, unspecified type D64.9 CBC with platelets differential   4. Fatigue, unspecified type R53.83 Basic metabolic panel   5. Cough R05 XR Chest 2 Views   6. Restless leg syndrome G25.81 HOME CARE NURSING REFERRAL     gabapentin (NEURONTIN) 100 MG capsule   7. Encounter for wound care Z51.89 HOME CARE NURSING REFERRAL   8. Rash R21  clotrimazole (LOTRIMIN) 1 % external cream   9. Type 2 diabetes mellitus without complication, with long-term current use of insulin (H) E11.9     Z79.4    1. Home care referral placed.. Keep follow up  With oncology 5/3/19  2. Labs pending.   3. Stable.   4. Labs pending  5. Unclear etiology. I staff messaged oncologist.  And it was recommended that she get a chest CT scan.   6. Start gabapentin 100mg nightly. warning signs discussed. side effects discussed. Recheck 2-4 wks  7. Dressing changed. warning signs discussed  8. Suspect fungal. Start lotrimen twice a day. Recheck 4 wks or sooner if worsens.   9. Diet controlled. Will monitor 3 months. She isn't interested in tx at this time.     Emery Kuhn PA-C  [facility]

## 2019-04-24 NOTE — PATIENT INSTRUCTIONS
Keep follow up  With oncologist 5/3/19.    Home RN will be calling in the next day or two.     Start gabapentin about 1 hour prior to bed time.     Start lotrimen crm on bottom, twice a day for about 4 wks.     Labs and x-ray today and I will let you know results.     Recheck will depend on results of test. Other wise in 3 months.

## 2019-04-26 ENCOUNTER — ANCILLARY PROCEDURE (OUTPATIENT)
Dept: CT IMAGING | Facility: CLINIC | Age: 84
End: 2019-04-26
Attending: PHYSICIAN ASSISTANT
Payer: COMMERCIAL

## 2019-04-26 ENCOUNTER — TELEPHONE (OUTPATIENT)
Dept: FAMILY MEDICINE | Facility: CLINIC | Age: 84
End: 2019-04-26

## 2019-04-26 DIAGNOSIS — R05.9 COUGH: ICD-10-CM

## 2019-04-26 DIAGNOSIS — J90 PLEURAL EFFUSION: ICD-10-CM

## 2019-04-26 PROCEDURE — 71260 CT THORAX DX C+: CPT | Mod: TC

## 2019-04-26 RX ORDER — IOPAMIDOL 755 MG/ML
80 INJECTION, SOLUTION INTRAVASCULAR ONCE
Status: COMPLETED | OUTPATIENT
Start: 2019-04-26 | End: 2019-04-26

## 2019-04-26 RX ADMIN — IOPAMIDOL 80 ML: 755 INJECTION, SOLUTION INTRAVASCULAR at 14:59

## 2019-04-26 NOTE — TELEPHONE ENCOUNTER
:   1. Please call pt and notify both CT with and without contrast are needed.  2. Please call Imaging scheduling. Provider signed 2 CT orders on 4/24/19, 1 for CT chest with contrast and 1 CTchest without contrast. Please confirm that Imaging staff are aware pt should be having both as ordered today below: CT Chest w/o & w Contrast (Order: [id number]) - 4/26/2019     RN contacted provider by phone regarding incoming pt message below.   Per Emery Kuhn PA-C, pt should be having both CT Chest with and without Contrast.  Emery Kuhn PA-C, states both were ordered separately due to unable to locate the order that listed both when ordering on 4/24/19.  Per [facility] per Emery Kuhn PA-C, an order for CT Chest w/o & w Contrast was ordered.    Johanna Steel RN

## 2019-04-26 NOTE — TELEPHONE ENCOUNTER
Called and spoke to patient's daughter.   Caller reports that redness is resolved with a little residual on the top of the left foot.   Patient has 2 remaining doses of clindamycin.   Per Dr. Marino patient to complete 10 days worth of medication.   Caller agrees and will follow up in clinic 5/1/2019 at planned.   Explained to caller if situation changes redness, swelling, pain warmth to call after hours and speak to on-call over the weekend.   Alka Gonzalez RN

## 2019-04-26 NOTE — TELEPHONE ENCOUNTER
Daughter calling on behalf of patient. Patient had Surgery on May 5th and was prescribed clindamycin 300mg by the surgeon. She has 2 doses left, they are wondering if she should continue it after that.     Her CT that was ordered by Emery is scheduled 2:30 today.

## 2019-04-26 NOTE — TELEPHONE ENCOUNTER
Per an InBasket request from clinical team, pathology has been requested via fax ([phone]) from [facility].

## 2019-04-26 NOTE — TELEPHONE ENCOUNTER
Spoke to Yadira at [facility] Scheduling states new order for CT with and without contrast is now correct. Notified Irma patient is good to go for appointment later today. States understanding./Jenna Monk,

## 2019-04-26 NOTE — TELEPHONE ENCOUNTER
Routing question about antibiotic that surgeon prescribed to surgeon's team to address.    Marybeth Oquendo RN, BSN

## 2019-04-26 NOTE — TELEPHONE ENCOUNTER
Per patients daughter, there are two orders in pt's chart for a CT, one with contrast and one without contrast, pt has an appointment today at 2:30 for a CT with contrast. Would like to know if someone can contact imaging and confirm that is the correct order for today.

## 2019-04-28 ENCOUNTER — MEDICAL CORRESPONDENCE (OUTPATIENT)
Dept: HEALTH INFORMATION MANAGEMENT | Facility: CLINIC | Age: 84
End: 2019-04-28

## 2019-04-29 ENCOUNTER — TELEPHONE (OUTPATIENT)
Dept: FAMILY MEDICINE | Facility: CLINIC | Age: 84
End: 2019-04-29

## 2019-04-29 NOTE — TELEPHONE ENCOUNTER
Home care is calling to request orders for: PT and OT to eval and treat, home health aid for personal care and shower for 2x wk for 5wks, nurse to assess patient wound, provider care, assess and teach 2x wk 3wks, 1x wk 2wks and 3 as needed. Please call to advise. Thank you.

## 2019-04-29 NOTE — TELEPHONE ENCOUNTER
Emery Kuhn PA-C;  Please sign in agreement of home care orders below.  You saw her on 4/24/19 and placed the referral for home care.  I did give verbal ok for the homecare orders to RN.  Vale Ortega RN

## 2019-04-30 ENCOUNTER — PATIENT OUTREACH (OUTPATIENT)
Dept: ONCOLOGY | Facility: CLINIC | Age: 84
End: 2019-04-30

## 2019-04-30 ENCOUNTER — TELEPHONE (OUTPATIENT)
Dept: SURGERY | Facility: CLINIC | Age: 84
End: 2019-04-30

## 2019-04-30 ENCOUNTER — DOCUMENTATION ONLY (OUTPATIENT)
Dept: CARE COORDINATION | Facility: CLINIC | Age: 84
End: 2019-04-30

## 2019-04-30 PROCEDURE — 88360 TUMOR IMMUNOHISTOCHEM/MANUAL: CPT | Mod: XU | Performed by: INTERNAL MEDICINE

## 2019-04-30 PROCEDURE — 88323 CONSLTJ&REPRT MATRL PREP SLD: CPT | Performed by: INTERNAL MEDICINE

## 2019-04-30 NOTE — TELEPHONE ENCOUNTER
Called and spoke with nurse and we are seeing her tomorrow so we will evaluate then. Isela Dowling Cma

## 2019-04-30 NOTE — PROGRESS NOTES
[facility] utilizes an encounter to take the place of a direct phone call to your office. Please take a moment to review the below request. Please reply or route message to author of this encounter.  Message will act as a verbal OK of orders requested below. Thank you.    ORDER/Physical Therapy 2x2 for there ex/balance/gait training and HEP

## 2019-04-30 NOTE — PROGRESS NOTES
Pathology slides confirmed as arrived at the [facility] from [facility] for review of groin mass.  Orders for PDL1 testing faxed to Pathology at the [facility] - order from Dr. Pham.  Fax confirmed as received.

## 2019-04-30 NOTE — TELEPHONE ENCOUNTER
Reason for call:  Other   Patient called regarding (reason for call): call back  Additional comments: Cindy from [facility] is calling because the patient has 2 open wounds on left groin. Please call back     Phone number to reach patient:  Other phone number:  [phone]    Best Time:  any    Can we leave a detailed message on this number?  YES

## 2019-05-01 ENCOUNTER — OFFICE VISIT (OUTPATIENT)
Dept: SURGERY | Facility: CLINIC | Age: 84
End: 2019-05-01
Payer: COMMERCIAL

## 2019-05-01 ENCOUNTER — DOCUMENTATION ONLY (OUTPATIENT)
Dept: SURGERY | Facility: CLINIC | Age: 84
End: 2019-05-01

## 2019-05-01 VITALS
HEART RATE: 82 BPM | SYSTOLIC BLOOD PRESSURE: 114 MMHG | WEIGHT: 166 LBS | HEIGHT: 59 IN | BODY MASS INDEX: 33.47 KG/M2 | DIASTOLIC BLOOD PRESSURE: 66 MMHG

## 2019-05-01 DIAGNOSIS — L89.301 PRESSURE INJURY OF BUTTOCK, STAGE 1, UNSPECIFIED LATERALITY: ICD-10-CM

## 2019-05-01 DIAGNOSIS — C79.89 METASTATIC SQUAMOUS CELL CARCINOMA INVOLVING SOFT TISSUE WITH UNKNOWN PRIMARY SITE (H): Primary | ICD-10-CM

## 2019-05-01 DIAGNOSIS — C80.1 METASTATIC SQUAMOUS CELL CARCINOMA INVOLVING SOFT TISSUE WITH UNKNOWN PRIMARY SITE (H): Primary | ICD-10-CM

## 2019-05-01 PROCEDURE — 99024 POSTOP FOLLOW-UP VISIT: CPT | Performed by: SURGERY

## 2019-05-01 ASSESSMENT — MIFFLIN-ST. JEOR: SCORE: 1112.6

## 2019-05-01 NOTE — PROGRESS NOTES
"General Surgery Post Op    Pt returns for follow up visit s/p resection large left groin mass on 4/5/19.    Patient has been doing well, has nurse helping at home now. Few areas of incision still open at skin level, could see a few of the subcutaneous staples. Drainage has been decreasing. No pus, no spreading redness around incision  Rash/redness on leg is now gone  Also notes a wound on buttocks    Physical exam: Vitals: /66   Pulse 82   Ht 1.505 m (4' 11.25\")   Wt 75.3 kg (166 lb)   BMI 33.24 kg/m    BMI= Body mass index is 33.24 kg/m .    Exam:  Constitutional: healthy, alert and no distress  Skin: left groin wound intact no signs of infection, no drainage. There is some superficial skin separation in the medial half of wound. There is an area of hardness that would be close to area of mass resection, uncertain if would be growing mass(would be very fast if is) vs tissue induration from surgery  Intergluteal cleft with small ~1-2 cm superficial skin ulceration and redness      Assessment:     ICD-10-CM    1. 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 Pressure injury of buttock, stage 1, unspecified laterality L89.301      Plan: Wound continues to heal, dressing changes as needed for any drainage. Xeroform gauze was over this middle portion which is fine to continue. Advised keeping gauze in intergluteal cleft to help control moisture on the skin and avoid pressure in that area. Looks like should heal. Patient with oncology visit on 5/3. Follow up with me 2-3 weeks to recheck wound, sooner if other concerns    Reynaldo Packer MD      "

## 2019-05-01 NOTE — PROGRESS NOTES
[facility] now requests orders and shares plan of care/discharge summaries for some patients through [facility].  Please REPLY TO THIS MESSAGE OR ROUTE BACK TO THE AUTHOR in order to give authorization for orders when needed.  This is considered a verbal order, you will still receive a faxed copy of orders for signature.  Thank you for your assistance in improving collaboration for our patients.    ORDER    Wound orders  1. Cleanse wound with Normal Saline or wound cleanser  2. Apply Xeroform gauze to open wounds  3. Apply 4x4 dressings on top  4. Place 4x4 in groin crease to keep dry  5. Change dressings Daily and PRN as needed with wound drainage  6. Family to do on non nursing days.    You may route your orders/recommendations back through [facility],    Thank you,    Rosemarie Downs RN, BSN    05/01/19  5:05 PM   [facility]  [phone]   [email or web address]

## 2019-05-03 ENCOUNTER — ONCOLOGY VISIT (OUTPATIENT)
Dept: ONCOLOGY | Facility: CLINIC | Age: 84
End: 2019-05-03
Attending: SURGERY
Payer: COMMERCIAL

## 2019-05-03 ENCOUNTER — ANCILLARY PROCEDURE (OUTPATIENT)
Dept: ULTRASOUND IMAGING | Facility: CLINIC | Age: 84
End: 2019-05-03
Attending: INTERNAL MEDICINE
Payer: COMMERCIAL

## 2019-05-03 ENCOUNTER — CARE COORDINATION (OUTPATIENT)
Dept: ONCOLOGY | Facility: CLINIC | Age: 84
End: 2019-05-03

## 2019-05-03 ENCOUNTER — PRE VISIT (OUTPATIENT)
Dept: ONCOLOGY | Facility: CLINIC | Age: 84
End: 2019-05-03

## 2019-05-03 VITALS
HEART RATE: 100 BPM | SYSTOLIC BLOOD PRESSURE: 125 MMHG | HEIGHT: 59 IN | WEIGHT: 169.5 LBS | OXYGEN SATURATION: 94 % | RESPIRATION RATE: 16 BRPM | TEMPERATURE: 98 F | DIASTOLIC BLOOD PRESSURE: 71 MMHG | BODY MASS INDEX: 34.17 KG/M2

## 2019-05-03 DIAGNOSIS — M79.89 LEFT LEG SWELLING: ICD-10-CM

## 2019-05-03 DIAGNOSIS — D64.9 ANEMIA, UNSPECIFIED TYPE: ICD-10-CM

## 2019-05-03 LAB
ALBUMIN SERPL-MCNC: 2.4 G/DL (ref 3.4–5)
ALP SERPL-CCNC: 63 U/L (ref 40–150)
ALT SERPL W P-5'-P-CCNC: 12 U/L (ref 0–50)
ANION GAP SERPL CALCULATED.3IONS-SCNC: 7 MMOL/L (ref 3–14)
AST SERPL W P-5'-P-CCNC: 12 U/L (ref 0–45)
BASOPHILS # BLD AUTO: 0.1 10E9/L (ref 0–0.2)
BASOPHILS NFR BLD AUTO: 0.4 %
BILIRUB SERPL-MCNC: 0.3 MG/DL (ref 0.2–1.3)
BUN SERPL-MCNC: 17 MG/DL (ref 7–30)
CALCIUM SERPL-MCNC: 8.4 MG/DL (ref 8.5–10.1)
CHLORIDE SERPL-SCNC: 96 MMOL/L (ref 94–109)
CO2 SERPL-SCNC: 27 MMOL/L (ref 20–32)
CREAT SERPL-MCNC: 0.87 MG/DL (ref 0.52–1.04)
DIFFERENTIAL METHOD BLD: ABNORMAL
EOSINOPHIL # BLD AUTO: 0 10E9/L (ref 0–0.7)
EOSINOPHIL NFR BLD AUTO: 0.3 %
ERYTHROCYTE [DISTWIDTH] IN BLOOD BY AUTOMATED COUNT: 16.4 % (ref 10–15)
FERRITIN SERPL-MCNC: 171 NG/ML (ref 8–252)
FOLATE SERPL-MCNC: 38.2 NG/ML
GFR SERPL CREATININE-BSD FRML MDRD: 61 ML/MIN/{1.73_M2}
GLUCOSE SERPL-MCNC: 169 MG/DL (ref 70–99)
HCT VFR BLD AUTO: 29 % (ref 35–47)
HGB BLD-MCNC: 9.2 G/DL (ref 11.7–15.7)
IMM GRANULOCYTES # BLD: 0.1 10E9/L (ref 0–0.4)
IMM GRANULOCYTES NFR BLD: 0.9 %
IRON SATN MFR SERPL: 6 % (ref 15–46)
IRON SERPL-MCNC: 12 UG/DL (ref 35–180)
LDH SERPL L TO P-CCNC: 160 U/L (ref 81–234)
LYMPHOCYTES # BLD AUTO: 1.2 10E9/L (ref 0.8–5.3)
LYMPHOCYTES NFR BLD AUTO: 7.6 %
MCH RBC QN AUTO: 28.4 PG (ref 26.5–33)
MCHC RBC AUTO-ENTMCNC: 31.7 G/DL (ref 31.5–36.5)
MCV RBC AUTO: 90 FL (ref 78–100)
MONOCYTES # BLD AUTO: 1.7 10E9/L (ref 0–1.3)
MONOCYTES NFR BLD AUTO: 10.9 %
NEUTROPHILS # BLD AUTO: 12.4 10E9/L (ref 1.6–8.3)
NEUTROPHILS NFR BLD AUTO: 79.9 %
PLATELET # BLD AUTO: 494 10E9/L (ref 150–450)
POTASSIUM SERPL-SCNC: 3.8 MMOL/L (ref 3.4–5.3)
PROT SERPL-MCNC: 6.4 G/DL (ref 6.8–8.8)
RBC # BLD AUTO: 3.24 10E12/L (ref 3.8–5.2)
RETICS # AUTO: 56.1 10E9/L (ref 25–95)
RETICS/RBC NFR AUTO: 1.7 % (ref 0.5–2)
SODIUM SERPL-SCNC: 130 MMOL/L (ref 133–144)
TIBC SERPL-MCNC: 193 UG/DL (ref 240–430)
VIT B12 SERPL-MCNC: 918 PG/ML (ref 193–986)
WBC # BLD AUTO: 15.5 10E9/L (ref 4–11)

## 2019-05-03 PROCEDURE — 83540 ASSAY OF IRON: CPT | Performed by: INTERNAL MEDICINE

## 2019-05-03 PROCEDURE — 82746 ASSAY OF FOLIC ACID SERUM: CPT | Performed by: INTERNAL MEDICINE

## 2019-05-03 PROCEDURE — 83615 LACTATE (LD) (LDH) ENZYME: CPT | Performed by: INTERNAL MEDICINE

## 2019-05-03 PROCEDURE — 82607 VITAMIN B-12: CPT | Performed by: INTERNAL MEDICINE

## 2019-05-03 PROCEDURE — 93971 EXTREMITY STUDY: CPT | Mod: LT | Performed by: RADIOLOGY

## 2019-05-03 PROCEDURE — 82728 ASSAY OF FERRITIN: CPT | Performed by: INTERNAL MEDICINE

## 2019-05-03 PROCEDURE — 83550 IRON BINDING TEST: CPT | Performed by: INTERNAL MEDICINE

## 2019-05-03 PROCEDURE — 99205 OFFICE O/P NEW HI 60 MIN: CPT | Performed by: INTERNAL MEDICINE

## 2019-05-03 PROCEDURE — 80053 COMPREHEN METABOLIC PANEL: CPT | Performed by: INTERNAL MEDICINE

## 2019-05-03 PROCEDURE — 36415 COLL VENOUS BLD VENIPUNCTURE: CPT | Performed by: INTERNAL MEDICINE

## 2019-05-03 PROCEDURE — 85025 COMPLETE CBC W/AUTO DIFF WBC: CPT | Performed by: INTERNAL MEDICINE

## 2019-05-03 PROCEDURE — 85060 BLOOD SMEAR INTERPRETATION: CPT | Performed by: INTERNAL MEDICINE

## 2019-05-03 PROCEDURE — 85045 AUTOMATED RETICULOCYTE COUNT: CPT | Performed by: INTERNAL MEDICINE

## 2019-05-03 ASSESSMENT — MIFFLIN-ST. JEOR: SCORE: 1128.48

## 2019-05-03 ASSESSMENT — PAIN SCALES - GENERAL: PAINLEVEL: NO PAIN (0)

## 2019-05-03 NOTE — LETTER
5/3/2019         RE: Flores Cuello  [address] [place] [address]        Dear Colleague,    Thank you for referring your patient, Flores Cuello, to the [facility]. Please see a copy of my visit note below.    Oncology initial visit:  Date on this visit: 5/3/2019    Flores Cuello  is referred by Dr.Mark Kevin Packer for an oncology consultation. She requires evaluation for new diagnosis of metastatic squamous cell cancer.    Primary Physician: Emery Kuhn     History Of Present Illness:  Ms. Cuello is a 84 year old female who was in good health but then around 3 months ago started to notice left leg swelling and progressive fatigue.  She then developed a left inguinal area swelling for which she had an ultrasound on 3/21/2019 which showed a left groin 8.9 cm complex cyst.  There was no DVT.  This was followed by a CT abdomen and pelvis on 3/25/2019 which showed a multiloculated left inguinal complex cystic mass 11.7 cm transverse, 7.5 cm AP and 8.2 cm craniocaudal dimension.   A 3.5 cm solid mass abuts the medial portion of this lesion, either a part of this lesion versus adjacent inguinal adenopathy. Numerous septations and enhancing solid components within this inguinal mass as well as inguinal, left external iliac adenopathy. 1.9 x 2 cm adenopathy adjacent to the proximal left common iliac artery. Left external iliac adenopathy is 7 cm AP dimension, with the anterior portion of this adenopathy 3 cm transverse dimension.  There were also multiple pulmonary nodules at the lung bases seen which were indeterminate but concerning for metastasis.    On 4/5/2019 she had an excisional biopsy of the left groin mass which was consistent with metastatic squamous cancer. p16 was positive.  There was no connection to the overlying skin.    As she was also found to have a left breast nodule it was biopsied and it was consistent with benign findings.    She had a  CT chest on 2019 which showed no new right pleural effusion and multiple bilateral pulmonary nodules which are very concerning for metastatic disease.  Patient tells me that she is also having cough for the last 2 to 3 months but she does not bring up much phlegm.  Over the last 3 months she has been progressively feeling more and more fatigued and previously she was very functional and now she is resting most of the time and even minimal exertion makes her very tired and exhausted and short of breath.  She can climb a flight of stairs and feels exhausted with it.  She denies any bleeding.  No nausea or vomiting.  She has been having poor appetite and has lost about 5 pounds over the last 3 months.  She continues to have the left lower extremity is swelling.  Overall the surgical wound is healing but there is an area which is not healed.  There is no infection.  She denies any other swellings.  No new skin problems.  Denies neuropathy.  Overall her mood has been down and she is feeling overwhelmed because of the recent developments.      ECOG 2-3    ROS:  A comprehensive ROS was otherwise neg        Past Medical/Surgical History:  Past Medical History:   Diagnosis Date     High cholesterol 2015     Hypertension      Hypertension goal BP (blood pressure) < 150/90 3/30/2018     Impaired fasting glucose 2012     Past Surgical History:   Procedure Laterality Date      SECTION        SECTION       Cancer History:   As above    Allergies:  Allergies as of 2019 - Reviewed 2019   Allergen Reaction Noted     Antihistamine [altaryl]  2012     Lisinopril  2012     Simvastatin  2012     Amoxicillin Rash 2012     Penicillins Rash 2012     Current Medications:  Current Outpatient Medications   Medication Sig Dispense Refill     albuterol (PROAIR HFA, PROVENTIL HFA, VENTOLIN HFA) 108 (90 BASE) MCG/ACT inhaler Inhale 2 puffs into the lungs every 6 hours  as needed for shortness of breath / dyspnea or wheezing 1 Inhaler 1     Cholecalciferol (VITAMIN D-3 PO) Take by mouth daily       clotrimazole (LOTRIMIN) 1 % external cream Apply topically 2 times daily 60 g 0     COD LIVER OIL PO Take  by mouth.       fluticasone (FLONASE) 50 MCG/ACT nasal spray SHAKE LIQUID AND USE 2 SPRAYS IN EACH NOSTRIL DAILY AS NEEDED FOR RHINITIS OR ALLERGIES 48 mL 2     gabapentin (NEURONTIN) 100 MG capsule Take 1 capsule (100 mg) by mouth At Bedtime 30 capsule 0     hydrochlorothiazide (HYDRODIURIL) 25 MG tablet TAKE 1 TABLET(25 MG) BY MOUTH DAILY 90 tablet 1     MAGNESIUM OXIDE PO        metoprolol succinate ER (TOPROL-XL) 50 MG 24 hr tablet TAKE 1 TABLET(50 MG) BY MOUTH DAILY 90 tablet 1     Multiple Vitamin (DAILY MULTIVITAMIN PO) Take  by mouth daily.       VITAMIN E daily       aspirin 81 MG chewable tablet Take 1 tablet (81 mg) by mouth daily (Patient not taking: Reported on 4/24/2019) 108 tablet 3      Family History:  No family history on file.     Mom had ovarian cancer at 90  Both Sisters have kidney cancer. One had it at 71 and other had it at 81.  Bother had prostate cancer. She is not sure of the age  She has 3 children and all are healthy  Social History:  Social History     Socioeconomic History     Marital status:      Spouse name: Not on file     Number of children: Not on file     Years of education: Not on file     Highest education level: Not on file   Occupational History     Not on file   Social Needs     Financial resource strain: Not on file     Food insecurity:     Worry: Not on file     Inability: Not on file     Transportation needs:     Medical: Not on file     Non-medical: Not on file   Tobacco Use     Smoking status: Never Smoker     Smokeless tobacco: Never Used   Substance and Sexual Activity     Alcohol use: No     Drug use: No     Sexual activity: Yes   Lifestyle     Physical activity:     Days per week: Not on file     Minutes per session: Not on  "file     Stress: Not on file   Relationships     Social connections:     Talks on phone: Not on file     Gets together: Not on file     Attends Religion service: Not on file     Active member of club or organization: Not on file     Attends meetings of clubs or organizations: Not on file     Relationship status: Not on file     Intimate partner violence:     Fear of current or ex partner: Not on file     Emotionally abused: Not on file     Physically abused: Not on file     Forced sexual activity: Not on file   Other Topics Concern     Parent/sibling w/ CABG, MI or angioplasty before 65F 55M? Not Asked   Social History Narrative     Not on file   no smoking. No etoh. Lives with .  Physical Exam:  /71 (BP Location: Left arm)   Pulse 100   Temp 98  F (36.7  C) (Oral)   Resp 16   Ht 1.505 m (4' 11.25\")   Wt 76.9 kg (169 lb 8 oz)   SpO2 94%   BMI 33.94 kg/m     CONSTITUTIONAL: no acute distress  EYES: PERRLA, there is pallor but no icterus.  ENT/MOUTH: no mouth lesions. Ears normal  CVS: s1s2 no m r g .   RESPIRATORY: Decreased breath sounds at the right lung base.  Otherwise chest is clear to auscultation.  GI: soft non tender no hepatosplenomegaly  NEURO: AAOX3  Grossly non focal neuro exam  INTEGUMENT: no obvious rashes  LYMPHATIC: no palpable lymph nodes.  MUSCULOSKELETAL: Unremarkable. No bony tenderness.   EXTREMITIES: There is swelling in the left groin.  This is more consistent with postsurgical changes rather than any specific mass.  There is an area about 1 inch in length which is not healed.  She is applying Neosporin on this.  It does not look actively infected.  The left leg is significantly more swollen as compared to the right side.  There is no edema on the right side.  PSYCH: Mentation, mood and affect are normal. Decision making capacity is intact    Laboratory/Imaging Studies    Results for BRAULIO ALMEIDA (MRN [id number]) as of 5/3/2019 09:51   Ref. Range 4/24/2019 14:24 "   Sodium Latest Ref Range: 133 - 144 mmol/L 132 (L)   Potassium Latest Ref Range: 3.4 - 5.3 mmol/L 4.0   Chloride Latest Ref Range: 94 - 109 mmol/L 98   Carbon Dioxide Latest Ref Range: 20 - 32 mmol/L 25   Urea Nitrogen Latest Ref Range: 7 - 30 mg/dL 24   Creatinine Latest Ref Range: 0.52 - 1.04 mg/dL 0.94   GFR Estimate Latest Ref Range: >60 mL/min/1.73_m2 55 (L)   GFR Estimate If Black Latest Ref Range: >60 mL/min/1.73_m2 64   Calcium Latest Ref Range: 8.5 - 10.1 mg/dL 9.2   Anion Gap Latest Ref Range: 3 - 14 mmol/L 9   Glucose Latest Ref Range: 70 - 99 mg/dL 129 (H)   WBC Latest Ref Range: 4.0 - 11.0 10e9/L 15.7 (H)   Hemoglobin Latest Ref Range: 11.7 - 15.7 g/dL 9.3 (L)   Hematocrit Latest Ref Range: 35.0 - 47.0 % 29.7 (L)   Platelet Count Latest Ref Range: 150 - 450 10e9/L 540 (H)   RBC Count Latest Ref Range: 3.8 - 5.2 10e12/L 3.25 (L)   MCV Latest Ref Range: 78 - 100 fl 91   MCH Latest Ref Range: 26.5 - 33.0 pg 28.6   MCHC Latest Ref Range: 31.5 - 36.5 g/dL 31.3 (L)   RDW Latest Ref Range: 10.0 - 15.0 % 17.4 (H)   Diff Method Unknown Automated Method   % Neutrophils Latest Units: % 75.8   % Lymphocytes Latest Units: % 12.3   % Monocytes Latest Units: % 10.8   % Eosinophils Latest Units: % 0.7   % Basophils Latest Units: % 0.4   Absolute Neutrophil Latest Ref Range: 1.6 - 8.3 10e9/L 11.9 (H)   Absolute Lymphocytes Latest Ref Range: 0.8 - 5.3 10e9/L 1.9   Absolute Monocytes Latest Ref Range: 0.0 - 1.3 10e9/L 1.7 (H)   Absolute Eosinophils Latest Ref Range: 0.0 - 0.7 10e9/L 0.1   Absolute Basophils Latest Ref Range: 0.0 - 0.2 10e9/L 0.1       CT ABDOMEN AND PELVIS WITH CONTRAST 3/25/2019 4:15 PM     TECHNIQUE: Images from diaphragm to pubic symphysis 84 mL Isovue-370.  Radiation dose for this scan was reduced using automated exposure  control, adjustment of the mA and/or kV according to patient size, or  iterative reconstruction technique.     HISTORY: Abdominal mass, nonpulsatile, palpable. Left groin  mass,  possible cyst or abscess. Infected sebaceous cyst.     COMPARISON: 3/21/2019 ultrasound with 8.9 cm complex cyst left groin.     FINDINGS:  There is a multiloculated left inguinal complex cystic mass  11.7 cm transverse, 7.5 cm AP and 8.2 cm craniocaudal dimension. This  has a  predominantly cystic but complex lateral component and is more  complex with more solid components medially. A 3.5 cm solid mass abuts  the medial portion of this lesion, either a part of this lesion versus  adjacent inguinal adenopathy. Numerous septations and enhancing solid  components within this inguinal mass as well as inguinal, left  external iliac adenopathy. 1.9 x 2 cm adenopathy adjacent to the  proximal left common iliac artery on image 41. Left external iliac  adenopathy is 7 cm AP dimension, series 4 image 6, with the anterior  portion of this adenopathy 3 cm transverse dimension.     Multiple pulmonary nodules at the lung bases are indeterminate but  concerning for metastatic disease with a representative 1.2 cm left  lower lobe nodule, series 2 image 11. No aggressive bone lesions.  Normal-appearing liver, gallbladder, pancreas and adrenal glands.  Bilateral peripelvic renal cysts, larger on the left than the right.  No acute-appearing bowel abnormality. Mild sigmoid diverticulosis. No  free fluid. Absent uterus. Soft tissue nodule in the subcutaneous fat  of the lower left breast/chest wall on series 2 image 10, 1.9 cm in  size, is nonspecific but concerning for malignancy.                                                                      IMPRESSION:  1. Very complex cystic mass in the left inguinal region with  associated inguinal, external iliac and superior pelvic adenopathy.  This is of indeterminate etiology highly concerning for malignancy.  There is a solid 3 cm nodule which is either part of or immediately  adjacent to this mass in the medial left inguinal region, series 4  image 19, which would be amenable  to percutaneous biopsy.  2. Inferior left breast nodule concerning for neoplasm with further  evaluation needed.  3. Multiple lung nodules are also concerning for metastatic disease.        Results for orders placed or performed in visit on 04/26/19   CT Chest w Contrast    Narrative    CT CHEST WITH CONTRAST4/26/2019 3:09 PM    HISTORY: Pleural effusion. Metastatic squamous cell carcinoma (H).  Cough.     TECHNIQUE: Axial images from thoracic inlet to diaphragm. 80 mL  Isovue-370. Radiation dose for this scan was reduced using automated  exposure control, adjustment of the mA and/or kV according to patient  size, or iterative reconstruction technique.    COMPARISON: 4/24/2019 chest x-ray and 3/25/2019 CT abdomen and pelvis.    FINDINGS:   Chest: Small right pleural effusion. Multiple bilateral pulmonary  nodules are highly concerning for metastatic disease with a  representative nodule including a 0.95 cm nodule in the anterior  medial right upper lobe series 3 image 16, irregular-shaped 2 x 1.4 cm  left upper lobe nodule or opacity series 3 image 26 and 1.4 x 1.5 cm  nodule left lung base series 3 image 42. There is mediastinal and  right hilar adenopathy including superior mediastinal, right  paratracheal and subcarinal adenopathy. 3.4 x 1.8 cm subcarinal node  series 2 image 28 and 2.3 x 1.7 cm right anterior hilar node image 22.    Indeterminate 1.3 cm nodule in the left chest wall/breast series 2  image 37. Visualized portions of the liver, gallbladder, spleen,  pancreas and adrenal glands unremarkable. No frankly destructive bone  lesions demonstrated. Peripelvic renal cysts.      Impression    IMPRESSION:  1. Mediastinal and right hilar adenopathy. Numerous bilateral  pulmonary nodules and left breast nodule concerning for malignancy.  Some of the pulmonary nodules were seen on 3/25/2019 CT abdomen and  pelvis. Right pleural effusion is new since 3/25/2019. Left breast  nodule is indeterminate etiology but  could be metastatic or primary  neoplasm.    DIONICIO WHITLEY MD     PATHOLOGY 4/5/19  A. Skin and soft tissue, left groin mass, wide local excision-Squamous cell carcinoma with involvement of deep margin, favor metastatic (see comment).  P16 immunostain is positive.    The left groin mass is represented by squamous cell carcinoma with extensive necrosis. There is no demonstrable connection to the overlying skin and the architecture is indicative of a metastatic process.       B. Left breast, inferior, needle core biopsy-Fat necrosis and hemorrhage consistent with hematoma/cyst. Negative for malignancy.    ASSESSMENT/PLAN:    She has metastatic squamous cell cancer.  P 16 is positive.  She has bilateral pulmonary nodules as well as pelvic lymphadenopathy in the left inguinal mass which was biopsied.  I do not know the exact origin of the cancer at this time.  We discussed the situation in detail.  We discussed that this disease is not curable because it is metastatic in nature.  The options going forward would be to try chemotherapy but considering her old age and current frail state, the chances of complications would be high.  If I have to choose chemotherapy then I would like rather go with a single agent like docetaxel or nab paclitaxel.  We briefly discussed the rationale and potential side effects of chemotherapy.    I will check the tumor for PDL 1 testing to see if she would qualify for immunotherapy.  If she does qualify for immunotherapy then I would recommend that we start her on Keytruda every 3 weeks.    We also discussed on the option of not trying active anticancer therapy but focusing all her efforts in making sure that she is made and remains comfortable.  That would be hospice approach.  At this time she is not very enthusiastic about getting chemotherapy but she is open to immunotherapy if possible.  She is going to think about it and in the meantime will will do the testing and decide about all of  this later on.    Anemia.  We will check anemia work-up.    Leukocytosis and thrombocytosis.  These could be reactive.  We will check peripheral blood smear.    Left lower extremity swelling.  Previously ultrasound was negative for DVT.  The swelling is persistent despite removal of the inguinal mass.  I will repeat ultrasound to rule out DVT.    We discussed that she should have advanced care planning and we discussed about goals of care and she is in process of preparing the document and she tells me that she has all of the paperwork for that.    Tentatively I would like to see her back in 1 week and I hope that at that time we will have the results of PD-L1 back and you can take the discussion forward.    I answered all of her and her daughter's questions to their satisfaction.  They are agreeable and comfortable with the plan.    Nereyda Pham            Again, thank you for allowing me to participate in the care of your patient.        Sincerely,        Nereyda Pham MD

## 2019-05-03 NOTE — NURSING NOTE
"Oncology Rooming Note    May 3, 2019 9:46 AM   Flores Cuello is a 84 year old female who presents for:    Chief Complaint   Patient presents with     Oncology Clinic Visit     New patient     Initial Vitals: /71 (BP Location: Left arm)   Pulse 100   Temp 98  F (36.7  C) (Oral)   Resp 16   Ht 1.505 m (4' 11.25\")   Wt 76.9 kg (169 lb 8 oz)   SpO2 94%   BMI 33.94 kg/m   Estimated body mass index is 33.94 kg/m  as calculated from the following:    Height as of this encounter: 1.505 m (4' 11.25\").    Weight as of this encounter: 76.9 kg (169 lb 8 oz). Body surface area is 1.79 meters squared.  No Pain (0) Comment: Data Unavailable   No LMP recorded. Patient is postmenopausal.  Allergies reviewed: Yes  Medications reviewed: Yes    Medications: Medication refills not needed today.  Pharmacy name entered into [facility]: [facility] [address] [place] [address] [id number] AT [facility] & [place]      Gisella Plunkett LPN            "

## 2019-05-03 NOTE — PROGRESS NOTES
Oncology initial visit:  Date on this visit: 5/3/2019    Flores Cuello  is referred by Dr.Mark Kevin Packer for an oncology consultation. She requires evaluation for new diagnosis of metastatic squamous cell cancer.    Primary Physician: Emery Kuhn     History Of Present Illness:  Ms. Cuello is a 84 year old female who was in good health but then around 3 months ago started to notice left leg swelling and progressive fatigue.  She then developed a left inguinal area swelling for which she had an ultrasound on 3/21/2019 which showed a left groin 8.9 cm complex cyst.  There was no DVT.  This was followed by a CT abdomen and pelvis on 3/25/2019 which showed a multiloculated left inguinal complex cystic mass 11.7 cm transverse, 7.5 cm AP and 8.2 cm craniocaudal dimension.   A 3.5 cm solid mass abuts the medial portion of this lesion, either a part of this lesion versus adjacent inguinal adenopathy. Numerous septations and enhancing solid components within this inguinal mass as well as inguinal, left external iliac adenopathy. 1.9 x 2 cm adenopathy adjacent to the proximal left common iliac artery. Left external iliac adenopathy is 7 cm AP dimension, with the anterior portion of this adenopathy 3 cm transverse dimension.  There were also multiple pulmonary nodules at the lung bases seen which were indeterminate but concerning for metastasis.    On 4/5/2019 she had an excisional biopsy of the left groin mass which was consistent with metastatic squamous cancer. p16 was positive.  There was no connection to the overlying skin.    As she was also found to have a left breast nodule it was biopsied and it was consistent with benign findings.    She had a CT chest on 4/26/2019 which showed no new right pleural effusion and multiple bilateral pulmonary nodules which are very concerning for metastatic disease.  Patient tells me that she is also having cough for the last 2 to 3 months but she does not bring  up much phlegm.  Over the last 3 months she has been progressively feeling more and more fatigued and previously she was very functional and now she is resting most of the time and even minimal exertion makes her very tired and exhausted and short of breath.  She can climb a flight of stairs and feels exhausted with it.  She denies any bleeding.  No nausea or vomiting.  She has been having poor appetite and has lost about 5 pounds over the last 3 months.  She continues to have the left lower extremity is swelling.  Overall the surgical wound is healing but there is an area which is not healed.  There is no infection.  She denies any other swellings.  No new skin problems.  Denies neuropathy.  Overall her mood has been down and she is feeling overwhelmed because of the recent developments.      ECOG 2-3    ROS:  A comprehensive ROS was otherwise neg        Past Medical/Surgical History:  Past Medical History:   Diagnosis Date     High cholesterol 2015     Hypertension      Hypertension goal BP (blood pressure) < 150/90 3/30/2018     Impaired fasting glucose 2012     Past Surgical History:   Procedure Laterality Date      SECTION        SECTION       Cancer History:   As above    Allergies:  Allergies as of 2019 - Reviewed 2019   Allergen Reaction Noted     Antihistamine [altaryl]  2012     Lisinopril  2012     Simvastatin  2012     Amoxicillin Rash 2012     Penicillins Rash 2012     Current Medications:  Current Outpatient Medications   Medication Sig Dispense Refill     albuterol (PROAIR HFA, PROVENTIL HFA, VENTOLIN HFA) 108 (90 BASE) MCG/ACT inhaler Inhale 2 puffs into the lungs every 6 hours as needed for shortness of breath / dyspnea or wheezing 1 Inhaler 1     Cholecalciferol (VITAMIN D-3 PO) Take by mouth daily       clotrimazole (LOTRIMIN) 1 % external cream Apply topically 2 times daily 60 g 0     COD LIVER OIL PO Take  by mouth.        fluticasone (FLONASE) 50 MCG/ACT nasal spray SHAKE LIQUID AND USE 2 SPRAYS IN EACH NOSTRIL DAILY AS NEEDED FOR RHINITIS OR ALLERGIES 48 mL 2     gabapentin (NEURONTIN) 100 MG capsule Take 1 capsule (100 mg) by mouth At Bedtime 30 capsule 0     hydrochlorothiazide (HYDRODIURIL) 25 MG tablet TAKE 1 TABLET(25 MG) BY MOUTH DAILY 90 tablet 1     MAGNESIUM OXIDE PO        metoprolol succinate ER (TOPROL-XL) 50 MG 24 hr tablet TAKE 1 TABLET(50 MG) BY MOUTH DAILY 90 tablet 1     Multiple Vitamin (DAILY MULTIVITAMIN PO) Take  by mouth daily.       VITAMIN E daily       aspirin 81 MG chewable tablet Take 1 tablet (81 mg) by mouth daily (Patient not taking: Reported on 4/24/2019) 108 tablet 3      Family History:  No family history on file.     Mom had ovarian cancer at 90  Both Sisters have kidney cancer. One had it at 71 and other had it at 81.  Bother had prostate cancer. She is not sure of the age  She has 3 children and all are healthy  Social History:  Social History     Socioeconomic History     Marital status:      Spouse name: Not on file     Number of children: Not on file     Years of education: Not on file     Highest education level: Not on file   Occupational History     Not on file   Social Needs     Financial resource strain: Not on file     Food insecurity:     Worry: Not on file     Inability: Not on file     Transportation needs:     Medical: Not on file     Non-medical: Not on file   Tobacco Use     Smoking status: Never Smoker     Smokeless tobacco: Never Used   Substance and Sexual Activity     Alcohol use: No     Drug use: No     Sexual activity: Yes   Lifestyle     Physical activity:     Days per week: Not on file     Minutes per session: Not on file     Stress: Not on file   Relationships     Social connections:     Talks on phone: Not on file     Gets together: Not on file     Attends Muslim service: Not on file     Active member of club or organization: Not on file     Attends meetings of  "clubs or organizations: Not on file     Relationship status: Not on file     Intimate partner violence:     Fear of current or ex partner: Not on file     Emotionally abused: Not on file     Physically abused: Not on file     Forced sexual activity: Not on file   Other Topics Concern     Parent/sibling w/ CABG, MI or angioplasty before 65F 55M? Not Asked   Social History Narrative     Not on file   no smoking. No etoh. Lives with .  Physical Exam:  /71 (BP Location: Left arm)   Pulse 100   Temp 98  F (36.7  C) (Oral)   Resp 16   Ht 1.505 m (4' 11.25\")   Wt 76.9 kg (169 lb 8 oz)   SpO2 94%   BMI 33.94 kg/m    CONSTITUTIONAL: no acute distress  EYES: PERRLA, there is pallor but no icterus.  ENT/MOUTH: no mouth lesions. Ears normal  CVS: s1s2 no m r g .   RESPIRATORY: Decreased breath sounds at the right lung base.  Otherwise chest is clear to auscultation.  GI: soft non tender no hepatosplenomegaly  NEURO: AAOX3  Grossly non focal neuro exam  INTEGUMENT: no obvious rashes  LYMPHATIC: no palpable lymph nodes.  MUSCULOSKELETAL: Unremarkable. No bony tenderness.   EXTREMITIES: There is swelling in the left groin.  This is more consistent with postsurgical changes rather than any specific mass.  There is an area about 1 inch in length which is not healed.  She is applying Neosporin on this.  It does not look actively infected.  The left leg is significantly more swollen as compared to the right side.  There is no edema on the right side.  PSYCH: Mentation, mood and affect are normal. Decision making capacity is intact    Laboratory/Imaging Studies    Results for BRAULIO ALMEIDA (MRN [id number]) as of 5/3/2019 09:51   Ref. Range 4/24/2019 14:24   Sodium Latest Ref Range: 133 - 144 mmol/L 132 (L)   Potassium Latest Ref Range: 3.4 - 5.3 mmol/L 4.0   Chloride Latest Ref Range: 94 - 109 mmol/L 98   Carbon Dioxide Latest Ref Range: 20 - 32 mmol/L 25   Urea Nitrogen Latest Ref Range: 7 - 30 mg/dL 24 "   Creatinine Latest Ref Range: 0.52 - 1.04 mg/dL 0.94   GFR Estimate Latest Ref Range: >60 mL/min/1.73_m2 55 (L)   GFR Estimate If Black Latest Ref Range: >60 mL/min/1.73_m2 64   Calcium Latest Ref Range: 8.5 - 10.1 mg/dL 9.2   Anion Gap Latest Ref Range: 3 - 14 mmol/L 9   Glucose Latest Ref Range: 70 - 99 mg/dL 129 (H)   WBC Latest Ref Range: 4.0 - 11.0 10e9/L 15.7 (H)   Hemoglobin Latest Ref Range: 11.7 - 15.7 g/dL 9.3 (L)   Hematocrit Latest Ref Range: 35.0 - 47.0 % 29.7 (L)   Platelet Count Latest Ref Range: 150 - 450 10e9/L 540 (H)   RBC Count Latest Ref Range: 3.8 - 5.2 10e12/L 3.25 (L)   MCV Latest Ref Range: 78 - 100 fl 91   MCH Latest Ref Range: 26.5 - 33.0 pg 28.6   MCHC Latest Ref Range: 31.5 - 36.5 g/dL 31.3 (L)   RDW Latest Ref Range: 10.0 - 15.0 % 17.4 (H)   Diff Method Unknown Automated Method   % Neutrophils Latest Units: % 75.8   % Lymphocytes Latest Units: % 12.3   % Monocytes Latest Units: % 10.8   % Eosinophils Latest Units: % 0.7   % Basophils Latest Units: % 0.4   Absolute Neutrophil Latest Ref Range: 1.6 - 8.3 10e9/L 11.9 (H)   Absolute Lymphocytes Latest Ref Range: 0.8 - 5.3 10e9/L 1.9   Absolute Monocytes Latest Ref Range: 0.0 - 1.3 10e9/L 1.7 (H)   Absolute Eosinophils Latest Ref Range: 0.0 - 0.7 10e9/L 0.1   Absolute Basophils Latest Ref Range: 0.0 - 0.2 10e9/L 0.1       CT ABDOMEN AND PELVIS WITH CONTRAST 3/25/2019 4:15 PM     TECHNIQUE: Images from diaphragm to pubic symphysis 84 mL Isovue-370.  Radiation dose for this scan was reduced using automated exposure  control, adjustment of the mA and/or kV according to patient size, or  iterative reconstruction technique.     HISTORY: Abdominal mass, nonpulsatile, palpable. Left groin mass,  possible cyst or abscess. Infected sebaceous cyst.     COMPARISON: 3/21/2019 ultrasound with 8.9 cm complex cyst left groin.     FINDINGS:  There is a multiloculated left inguinal complex cystic mass  11.7 cm transverse, 7.5 cm AP and 8.2 cm craniocaudal  dimension. This  has a  predominantly cystic but complex lateral component and is more  complex with more solid components medially. A 3.5 cm solid mass abuts  the medial portion of this lesion, either a part of this lesion versus  adjacent inguinal adenopathy. Numerous septations and enhancing solid  components within this inguinal mass as well as inguinal, left  external iliac adenopathy. 1.9 x 2 cm adenopathy adjacent to the  proximal left common iliac artery on image 41. Left external iliac  adenopathy is 7 cm AP dimension, series 4 image 6, with the anterior  portion of this adenopathy 3 cm transverse dimension.     Multiple pulmonary nodules at the lung bases are indeterminate but  concerning for metastatic disease with a representative 1.2 cm left  lower lobe nodule, series 2 image 11. No aggressive bone lesions.  Normal-appearing liver, gallbladder, pancreas and adrenal glands.  Bilateral peripelvic renal cysts, larger on the left than the right.  No acute-appearing bowel abnormality. Mild sigmoid diverticulosis. No  free fluid. Absent uterus. Soft tissue nodule in the subcutaneous fat  of the lower left breast/chest wall on series 2 image 10, 1.9 cm in  size, is nonspecific but concerning for malignancy.                                                                      IMPRESSION:  1. Very complex cystic mass in the left inguinal region with  associated inguinal, external iliac and superior pelvic adenopathy.  This is of indeterminate etiology highly concerning for malignancy.  There is a solid 3 cm nodule which is either part of or immediately  adjacent to this mass in the medial left inguinal region, series 4  image 19, which would be amenable to percutaneous biopsy.  2. Inferior left breast nodule concerning for neoplasm with further  evaluation needed.  3. Multiple lung nodules are also concerning for metastatic disease.        Results for orders placed or performed in visit on 04/26/19   CT Chest w  Contrast    Narrative    CT CHEST WITH CONTRAST4/26/2019 3:09 PM    HISTORY: Pleural effusion. Metastatic squamous cell carcinoma (H).  Cough.     TECHNIQUE: Axial images from thoracic inlet to diaphragm. 80 mL  Isovue-370. Radiation dose for this scan was reduced using automated  exposure control, adjustment of the mA and/or kV according to patient  size, or iterative reconstruction technique.    COMPARISON: 4/24/2019 chest x-ray and 3/25/2019 CT abdomen and pelvis.    FINDINGS:   Chest: Small right pleural effusion. Multiple bilateral pulmonary  nodules are highly concerning for metastatic disease with a  representative nodule including a 0.95 cm nodule in the anterior  medial right upper lobe series 3 image 16, irregular-shaped 2 x 1.4 cm  left upper lobe nodule or opacity series 3 image 26 and 1.4 x 1.5 cm  nodule left lung base series 3 image 42. There is mediastinal and  right hilar adenopathy including superior mediastinal, right  paratracheal and subcarinal adenopathy. 3.4 x 1.8 cm subcarinal node  series 2 image 28 and 2.3 x 1.7 cm right anterior hilar node image 22.    Indeterminate 1.3 cm nodule in the left chest wall/breast series 2  image 37. Visualized portions of the liver, gallbladder, spleen,  pancreas and adrenal glands unremarkable. No frankly destructive bone  lesions demonstrated. Peripelvic renal cysts.      Impression    IMPRESSION:  1. Mediastinal and right hilar adenopathy. Numerous bilateral  pulmonary nodules and left breast nodule concerning for malignancy.  Some of the pulmonary nodules were seen on 3/25/2019 CT abdomen and  pelvis. Right pleural effusion is new since 3/25/2019. Left breast  nodule is indeterminate etiology but could be metastatic or primary  neoplasm.    DIONICIO WHITLEY MD     PATHOLOGY 4/5/19  A. Skin and soft tissue, left groin mass, wide local excision-Squamous cell carcinoma with involvement of deep margin, favor metastatic (see comment).  P16 immunostain is  positive.    The left groin mass is represented by squamous cell carcinoma with extensive necrosis. There is no demonstrable connection to the overlying skin and the architecture is indicative of a metastatic process.       B. Left breast, inferior, needle core biopsy-Fat necrosis and hemorrhage consistent with hematoma/cyst. Negative for malignancy.    ASSESSMENT/PLAN:    She has metastatic squamous cell cancer.  P 16 is positive.  She has bilateral pulmonary nodules as well as pelvic lymphadenopathy in the left inguinal mass which was biopsied.  I do not know the exact origin of the cancer at this time.  We discussed the situation in detail.  We discussed that this disease is not curable because it is metastatic in nature.  The options going forward would be to try chemotherapy but considering her old age and current frail state, the chances of complications would be high.  If I have to choose chemotherapy then I would like rather go with a single agent like docetaxel or nab paclitaxel.  We briefly discussed the rationale and potential side effects of chemotherapy.    I will check the tumor for PDL 1 testing to see if she would qualify for immunotherapy.  If she does qualify for immunotherapy then I would recommend that we start her on Keytruda every 3 weeks.    We also discussed on the option of not trying active anticancer therapy but focusing all her efforts in making sure that she is made and remains comfortable.  That would be hospice approach.  At this time she is not very enthusiastic about getting chemotherapy but she is open to immunotherapy if possible.  She is going to think about it and in the meantime will will do the testing and decide about all of this later on.    Anemia.  We will check anemia work-up.    Leukocytosis and thrombocytosis.  These could be reactive.  We will check peripheral blood smear.    Left lower extremity swelling.  Previously ultrasound was negative for DVT.  The swelling is  persistent despite removal of the inguinal mass.  I will repeat ultrasound to rule out DVT.    We discussed that she should have advanced care planning and we discussed about goals of care and she is in process of preparing the document and she tells me that she has all of the paperwork for that.    Tentatively I would like to see her back in 1 week and I hope that at that time we will have the results of PD-L1 back and you can take the discussion forward.    I answered all of her and her daughter's questions to their satisfaction.  They are agreeable and comfortable with the plan.    Nereyda Pham

## 2019-05-03 NOTE — PROGRESS NOTES
Call placed to Surg/Path at [facility] with request for PD-L1 testing on outside pathology from [facility]. Order faxed to Surg/Path as requested.

## 2019-05-06 ENCOUNTER — TELEPHONE (OUTPATIENT)
Dept: ONCOLOGY | Facility: CLINIC | Age: 84
End: 2019-05-06

## 2019-05-06 LAB — COPATH REPORT: NORMAL

## 2019-05-06 NOTE — TELEPHONE ENCOUNTER
Called patient and left results on voicemail.   Per Dr Ankit Dowling, RN    Pl let her know that US did not show DVT

## 2019-05-08 ENCOUNTER — DOCUMENTATION ONLY (OUTPATIENT)
Dept: FAMILY MEDICINE | Facility: CLINIC | Age: 84
End: 2019-05-08

## 2019-05-08 NOTE — PROGRESS NOTES
[facility] now requests orders and shares plan of care/discharge summaries for some patients through [employer].  Please REPLY TO THIS MESSAGE OR ROUTE BACK TO THE AUTHOR in order to give authorization for orders when needed.  This is considered a verbal order, you will still receive a faxed copy of orders for signature.  Thank you for your assistance in improving collaboration for our patients.    ORDER ok for OT to cont 1w3 for HEP for endurance, EC ed and meal prep.    Irasema CAIN/L  [phone]  [email or web address]

## 2019-05-09 ENCOUNTER — DOCUMENTATION ONLY (OUTPATIENT)
Dept: FAMILY MEDICINE | Facility: CLINIC | Age: 84
End: 2019-05-09

## 2019-05-09 ENCOUNTER — TELEPHONE (OUTPATIENT)
Dept: SURGERY | Facility: CLINIC | Age: 84
End: 2019-05-09

## 2019-05-09 LAB — COPATH REPORT: NORMAL

## 2019-05-09 NOTE — TELEPHONE ENCOUNTER
Cate calling. She is with the patient. The waist incision is draining a lot. There is clear drainage. It started 2 days ago. No smell or pain.     The wound looks good.

## 2019-05-09 NOTE — PROGRESS NOTES
[facility] now requests orders and shares plan of care/discharge summaries for some patients through [employer].  Please REPLY TO THIS MESSAGE OR ROUTE BACK TO THE AUTHOR in order to give authorization for orders when needed.  This is considered a verbal order, you will still receive a faxed copy of orders for signature.  Thank you for your assistance in improving collaboration for our patients.    ORDER ok for OT to cont 1w3 for EC ed, HEP, and meal prep.    Irasema CAIN/L  [phone]  [email or web address]

## 2019-05-09 NOTE — TELEPHONE ENCOUNTER
Patient is s/p resection large left groin mass on 4/5/19. Patient's home care RN, Cate, reports patient's wound appears to be healing however there is still some clear drainage. Denies redness, swelling, foul odor, pain. RN advised to have patient continue to do dressing changes PRN for drainage and follow up as scheduled on 5/22/19.    Dixon Malone RN....5/9/2019 2:44 PM

## 2019-05-10 ENCOUNTER — ONCOLOGY VISIT (OUTPATIENT)
Dept: ONCOLOGY | Facility: CLINIC | Age: 84
End: 2019-05-10
Attending: INTERNAL MEDICINE
Payer: COMMERCIAL

## 2019-05-10 ENCOUNTER — CARE COORDINATION (OUTPATIENT)
Dept: ONCOLOGY | Facility: CLINIC | Age: 84
End: 2019-05-10

## 2019-05-10 VITALS
SYSTOLIC BLOOD PRESSURE: 121 MMHG | TEMPERATURE: 98.2 F | OXYGEN SATURATION: 97 % | DIASTOLIC BLOOD PRESSURE: 73 MMHG | HEART RATE: 94 BPM

## 2019-05-10 DIAGNOSIS — R63.0 ANOREXIA: Primary | ICD-10-CM

## 2019-05-10 LAB — PD-L1 BY IMMUNOHISTOCHEMISTRY: NORMAL

## 2019-05-10 PROCEDURE — 99215 OFFICE O/P EST HI 40 MIN: CPT | Performed by: INTERNAL MEDICINE

## 2019-05-10 RX ORDER — DEXAMETHASONE 4 MG/1
4 TABLET ORAL 2 TIMES DAILY WITH MEALS
Qty: 20 TABLET | Refills: 0 | Status: SHIPPED | OUTPATIENT
Start: 2019-05-10 | End: 2019-05-10

## 2019-05-10 RX ORDER — DEXAMETHASONE 4 MG/1
4 TABLET ORAL 2 TIMES DAILY WITH MEALS
Qty: 20 TABLET | Refills: 0 | Status: SHIPPED | OUTPATIENT
Start: 2019-05-10

## 2019-05-10 ASSESSMENT — PAIN SCALES - GENERAL: PAINLEVEL: NO PAIN (0)

## 2019-05-10 NOTE — PROGRESS NOTES
Oncology follow-up visit:  Date on this visit: 5/10/2019     Flores Cuello  is referred by Dr.Mark Kevin Packer for an oncology consultation. She requires evaluation for new diagnosis of metastatic squamous cell cancer.    Primary Physician: Emery Kuhn     History Of Present Illness:    Please see my previous note for details.  I have copied and updated from plan.  Ms. Cuello is a 84 year old female who was in good health but then around 3 months ago started to notice left leg swelling and progressive fatigue.  She then developed a left inguinal area swelling for which she had an ultrasound on 3/21/2019 which showed a left groin 8.9 cm complex cyst.  There was no DVT.  This was followed by a CT abdomen and pelvis on 3/25/2019 which showed a multiloculated left inguinal complex cystic mass 11.7 cm transverse, 7.5 cm AP and 8.2 cm craniocaudal dimension.   A 3.5 cm solid mass abuts the medial portion of this lesion, either a part of this lesion versus adjacent inguinal adenopathy. Numerous septations and enhancing solid components within this inguinal mass as well as inguinal, left external iliac adenopathy. 1.9 x 2 cm adenopathy adjacent to the proximal left common iliac artery. Left external iliac adenopathy is 7 cm AP dimension, with the anterior portion of this adenopathy 3 cm transverse dimension.  There were also multiple pulmonary nodules at the lung bases seen which were indeterminate but concerning for metastasis.    On 4/5/2019 she had an excisional biopsy of the left groin mass which was consistent with metastatic squamous cancer. p16 was positive.  There was no connection to the overlying skin.    As she was also found to have a left breast nodule it was biopsied and it was consistent with benign findings.    She had a CT chest on 4/26/2019 which showed no new right pleural effusion and multiple bilateral pulmonary nodules which are very concerning for metastatic disease.    When she  saw me last week, she was having coughing and progressive dyspnea and progressive fatigue.  She had a very poor performance status.  At that point I had discussed with her and she was not interested in chemotherapy.  I had also showed my deep reservations about giving her chemotherapy as I thought that it would be very difficult for her to handle.  We decided on checking PDL 1 status to see if she would be eligible for frontline Keytruda.  She is coming back to discuss the results.  Over the last 1 week her condition continues to deteriorate and she has been feeling more and more weak and is resting almost all the time.  She also feels more dyspnea on exertion.  She has some cough and she is taking Robitussin which helps.  She has poor appetite.  She is asking if anything can be given to her which might improve her appetite and energy level.  She denies any pain.  The left leg swelling is about the same.  The surgical wound has now healed.  Denies any fevers or infections.  She again tells me that she will not want chemotherapy.        ECOG 3    ROS:  Rest of the comprehensive review of the system was essentially unremarkable.    I reviewed other history in epic as below.    Past Medical/Surgical History:  Past Medical History:   Diagnosis Date     High cholesterol 2015     Hypertension      Hypertension goal BP (blood pressure) < 150/90 3/30/2018     Impaired fasting glucose 2012     Past Surgical History:   Procedure Laterality Date      SECTION        SECTION  1967     Cancer History:   As above    Allergies:  Allergies as of 05/10/2019 - Reviewed 05/10/2019   Allergen Reaction Noted     Antihistamine [altaryl]  2012     Lisinopril  2012     Simvastatin  2012     Amoxicillin Rash 2012     Penicillins Rash 2012     Current Medications:  Current Outpatient Medications   Medication Sig Dispense Refill     albuterol (PROAIR HFA, PROVENTIL HFA, VENTOLIN HFA)  108 (90 BASE) MCG/ACT inhaler Inhale 2 puffs into the lungs every 6 hours as needed for shortness of breath / dyspnea or wheezing 1 Inhaler 1     Cholecalciferol (VITAMIN D-3 PO) Take by mouth daily       clotrimazole (LOTRIMIN) 1 % external cream Apply topically 2 times daily 60 g 0     COD LIVER OIL PO Take  by mouth.       fluticasone (FLONASE) 50 MCG/ACT nasal spray SHAKE LIQUID AND USE 2 SPRAYS IN EACH NOSTRIL DAILY AS NEEDED FOR RHINITIS OR ALLERGIES 48 mL 2     gabapentin (NEURONTIN) 100 MG capsule Take 1 capsule (100 mg) by mouth At Bedtime 30 capsule 0     hydrochlorothiazide (HYDRODIURIL) 25 MG tablet TAKE 1 TABLET(25 MG) BY MOUTH DAILY 90 tablet 1     MAGNESIUM OXIDE PO        metoprolol succinate ER (TOPROL-XL) 50 MG 24 hr tablet TAKE 1 TABLET(50 MG) BY MOUTH DAILY 90 tablet 1     Multiple Vitamin (DAILY MULTIVITAMIN PO) Take  by mouth daily.       VITAMIN E daily       aspirin 81 MG chewable tablet Take 1 tablet (81 mg) by mouth daily (Patient not taking: Reported on 4/24/2019) 108 tablet 3      Family History:  History reviewed. No pertinent family history.     Mom had ovarian cancer at 90  Both Sisters have kidney cancer. One had it at 71 and other had it at 81.  Bother had prostate cancer. She is not sure of the age  She has 3 children and all are healthy  Social History:  Social History     Socioeconomic History     Marital status:      Spouse name: Not on file     Number of children: Not on file     Years of education: Not on file     Highest education level: Not on file   Occupational History     Not on file   Social Needs     Financial resource strain: Not on file     Food insecurity:     Worry: Not on file     Inability: Not on file     Transportation needs:     Medical: Not on file     Non-medical: Not on file   Tobacco Use     Smoking status: Never Smoker     Smokeless tobacco: Never Used   Substance and Sexual Activity     Alcohol use: No     Drug use: No     Sexual activity: Yes    Lifestyle     Physical activity:     Days per week: Not on file     Minutes per session: Not on file     Stress: Not on file   Relationships     Social connections:     Talks on phone: Not on file     Gets together: Not on file     Attends [employer] service: Not on file     Active member of club or organization: Not on file     Attends meetings of clubs or organizations: Not on file     Relationship status: Not on file     Intimate partner violence:     Fear of current or ex partner: Not on file     Emotionally abused: Not on file     Physically abused: Not on file     Forced sexual activity: Not on file   Other Topics Concern     Parent/sibling w/ CABG, MI or angioplasty before 65F 55M? Not Asked   Social History Narrative     Not on file   no smoking. No etoh. Lives with .  Physical Exam:  /73   Pulse 94   Temp 98.2  F (36.8  C) (Oral)   SpO2 97%    Wt Readings from Last 4 Encounters:   05/03/19 76.9 kg (169 lb 8 oz)   05/01/19 75.3 kg (166 lb)   04/24/19 75.3 kg (166 lb)   04/17/19 77.6 kg (171 lb)     CONSTITUTIONAL: No apparent distress  EYES: There is pallor but no jaundice  ENT/MOUTH: Ears unremarkable. No oral lesions  CVS: s1s2 normal  RESPIRATORY: Decreased breath sounds at the right lung base.  GI: Abdomen is benign  NEURO: Alert and oriented ×3  INTEGUMENT: no concerning skin rashes   LYMPHATIC: no palpable lymphadenopathy  MUSCULOSKELETAL: Unremarkable. No bony tenderness.   EXTREMITIES: The groin wound has healed well.  There is mild redness but it does not look actively infected.  Left leg is swollen as before.  There is mild edema on the right side.  PSYCH: Mentation, mood and affect are appropriate      Laboratory/Imaging Studies      CT ABDOMEN AND PELVIS WITH CONTRAST 3/25/2019 4:15 PM     TECHNIQUE: Images from diaphragm to pubic symphysis 84 mL Isovue-370.  Radiation dose for this scan was reduced using automated exposure  control, adjustment of the mA and/or kV according to  patient size, or  iterative reconstruction technique.     HISTORY: Abdominal mass, nonpulsatile, palpable. Left groin mass,  possible cyst or abscess. Infected sebaceous cyst.     COMPARISON: 3/21/2019 ultrasound with 8.9 cm complex cyst left groin.     FINDINGS:  There is a multiloculated left inguinal complex cystic mass  11.7 cm transverse, 7.5 cm AP and 8.2 cm craniocaudal dimension. This  has a  predominantly cystic but complex lateral component and is more  complex with more solid components medially. A 3.5 cm solid mass abuts  the medial portion of this lesion, either a part of this lesion versus  adjacent inguinal adenopathy. Numerous septations and enhancing solid  components within this inguinal mass as well as inguinal, left  external iliac adenopathy. 1.9 x 2 cm adenopathy adjacent to the  proximal left common iliac artery on image 41. Left external iliac  adenopathy is 7 cm AP dimension, series 4 image 6, with the anterior  portion of this adenopathy 3 cm transverse dimension.     Multiple pulmonary nodules at the lung bases are indeterminate but  concerning for metastatic disease with a representative 1.2 cm left  lower lobe nodule, series 2 image 11. No aggressive bone lesions.  Normal-appearing liver, gallbladder, pancreas and adrenal glands.  Bilateral peripelvic renal cysts, larger on the left than the right.  No acute-appearing bowel abnormality. Mild sigmoid diverticulosis. No  free fluid. Absent uterus. Soft tissue nodule in the subcutaneous fat  of the lower left breast/chest wall on series 2 image 10, 1.9 cm in  size, is nonspecific but concerning for malignancy.                                                                      IMPRESSION:  1. Very complex cystic mass in the left inguinal region with  associated inguinal, external iliac and superior pelvic adenopathy.  This is of indeterminate etiology highly concerning for malignancy.  There is a solid 3 cm nodule which is either part of  or immediately  adjacent to this mass in the medial left inguinal region, series 4  image 19, which would be amenable to percutaneous biopsy.  2. Inferior left breast nodule concerning for neoplasm with further  evaluation needed.  3. Multiple lung nodules are also concerning for metastatic disease.    Pathology reviewed here at the [facility].  FINAL DIAGNOSIS:   CASE FROM [facility], [place], [place] ([id number], OBTAINED   04/05/2019)   A. SKIN AND SOFT TISSUE, LEFT GROIN MASS, WIDE LOCAL EXCISION:   - SQUAMOUS CELL CARCINOMA, invasive keratinizing type, moderately to   poorly differentiated, extending to inked   deep margin.   - P16 immunostain is strongly positive in approx. 35% of the neoplastic   cells (performed at the referring   institution).     B. LEFT BREAST, INFERIOR, NEEDLE CORE BIOPSY:   - Small fragment of skeletal muscle and fibroadipose tissue with   granulation tissue and changes suggestive of   seroma/hematoma.   - Negative for malignancy.     I have personally reviewed all specimens and/or slides, including the   listed special stains, and used them   with my medical judgement to determine or confirm the final diagnosis.     Electronically signed out by:     Angel Barraza M.D., [facility]     CLINICAL HISTORY:   84-year-old female with left groin mass, metastatic squamous cell   carcinoma     GROSS:   Received from [facility] in [place], [place] are 15 stained   slides and 1 block (A2) labeled [id number]   (obtained 04/05/2019) and a copy of the referring pathologist's report   with patient identifying information.   All slides are returned.     MICROSCOPIC:   Microscopic examination was performed. P40 immunostain is diffusely and   strongly positive in the neoplastic   cells, confirming the morphologic impression and supporting the   interpretation. Best block for ancillary   studies is [id number].     RESULT FOR IMMUNOHISTOCHEMICAL VENTANA CLONE  PD-L1  ASSAY   TUMOR PROPORTION SCORE (TPS):  approx. 3-5%   INTERPRETATION: LOW PD-L1 EXPRESSION (TPS >/=1-49%)       PATHOLOGY 4/5/19  A. Skin and soft tissue, left groin mass, wide local excision-Squamous cell carcinoma with involvement of deep margin, favor metastatic (see comment).  P16 immunostain is positive.    The left groin mass is represented by squamous cell carcinoma with extensive necrosis. There is no demonstrable connection to the overlying skin and the architecture is indicative of a metastatic process.       B. Left breast, inferior, needle core biopsy-Fat necrosis and hemorrhage consistent with hematoma/cyst. Negative for malignancy.    ASSESSMENT/PLAN:    She has metastatic squamous cell cancer.  P 16 is positive.  She has bilateral pulmonary nodules as well as pelvic lymphadenopathy in the left inguinal mass which was biopsied.  I do not know the exact origin of the cancer at this time.  PDL 1 is low expression TPS 3 to 5%.    We discussed the situation in detail about her incurable illness.  Is because of her extremely poor performance status I do not recommend chemotherapy.  As the PDL 1 is low expression, it is unlikely that she would benefit from immunotherapy.  We did discuss that we can try Keytruda every 3 weeks and I discussed the rationale and potential side effects.    The other option would be to make sure that she remains comfortable and enrolled in hospice.  After thorough discussion she tells me that she does not want to try immunotherapy and she would prefer enrolling in hospice and I believe it is a reasonable decision.  We will give her a referral for hospice.    Poor appetite.  She is asking if she anything could be given to boost her appetite and energy level.  I recommend starting dexamethasone 4 mg daily in the morning to see if it helps.    Cough and shortness of breath.  This is basically due to the cancer risks.  In the lungs and pleural effusion.  She is taking Robitussin which  is helping with the cough and she will continue to do that.  Currently she does not require oxygen but we discussed that if need be then it could be provided through hospice.    Anemia.  She has anemia of chronic disease.    Leukocytosis and thrombocytosis.  These are likely reactive.      Left lower extremity swelling.  There is no DVT.  Continue to try to keep her legs elevated when she is lying down or sitting.      We again discussed that she should have advanced care planning and she is working on finalizing those documents.      End of life care.  We discussed about her wishes at the end of life and we discussed her CODE STATUS.  She wants to be DNR/DNI.  We will get POLST form signed today.  I told her to stick a copy to the refrigerator so that in case of an emergency everybody should know her wishes and not perform CPR against her wishes.    I wished her safe and comfortable to stay in hospice.  She knows to contact me if she has any questions or concerns    All of her and her daughter's questions were answered to their satisfaction.  They are agreeable and comfortable with the plan.    Nereyda Pham

## 2019-05-10 NOTE — LETTER
5/10/2019         RE: Flores Cuello  [address] [facility] [place] [address]        Dear Colleague,    Thank you for referring your patient, Flores Cuello, to the [facility]. Please see a copy of my visit note below.    Oncology follow-up visit:  Date on this visit: 5/10/2019     Flores Cuello  is referred by Dr.Mark Kevin Packer for an oncology consultation. She requires evaluation for new diagnosis of metastatic squamous cell cancer.    Primary Physician: Emery Kuhn     History Of Present Illness:    Please see my previous note for details.  I have copied and updated from plan.  Ms. Cuello is a 84 year old female who was in good health but then around 3 months ago started to notice left leg swelling and progressive fatigue.  She then developed a left inguinal area swelling for which she had an ultrasound on 3/21/2019 which showed a left groin 8.9 cm complex cyst.  There was no DVT.  This was followed by a CT abdomen and pelvis on 3/25/2019 which showed a multiloculated left inguinal complex cystic mass 11.7 cm transverse, 7.5 cm AP and 8.2 cm craniocaudal dimension.   A 3.5 cm solid mass abuts the medial portion of this lesion, either a part of this lesion versus adjacent inguinal adenopathy. Numerous septations and enhancing solid components within this inguinal mass as well as inguinal, left external iliac adenopathy. 1.9 x 2 cm adenopathy adjacent to the proximal left common iliac artery. Left external iliac adenopathy is 7 cm AP dimension, with the anterior portion of this adenopathy 3 cm transverse dimension.  There were also multiple pulmonary nodules at the lung bases seen which were indeterminate but concerning for metastasis.    On 4/5/2019 she had an excisional biopsy of the left groin mass which was consistent with metastatic squamous cancer. p16 was positive.  There was no connection to the overlying skin.    As she was also found to have a left  breast nodule it was biopsied and it was consistent with benign findings.    She had a CT chest on 2019 which showed no new right pleural effusion and multiple bilateral pulmonary nodules which are very concerning for metastatic disease.    When she saw me last week, she was having coughing and progressive dyspnea and progressive fatigue.  She had a very poor performance status.  At that point I had discussed with her and she was not interested in chemotherapy.  I had also showed my deep reservations about giving her chemotherapy as I thought that it would be very difficult for her to handle.  We decided on checking PDL 1 status to see if she would be eligible for frontline Keytruda.  She is coming back to discuss the results.  Over the last 1 week her condition continues to deteriorate and she has been feeling more and more weak and is resting almost all the time.  She also feels more dyspnea on exertion.  She has some cough and she is taking Robitussin which helps.  She has poor appetite.  She is asking if anything can be given to her which might improve her appetite and energy level.  She denies any pain.  The left leg swelling is about the same.  The surgical wound has now healed.  Denies any fevers or infections.  She again tells me that she will not want chemotherapy.        ECOG 3    ROS:  Rest of the comprehensive review of the system was essentially unremarkable.    I reviewed other history in epic as below.    Past Medical/Surgical History:  Past Medical History:   Diagnosis Date     High cholesterol 2015     Hypertension      Hypertension goal BP (blood pressure) < 150/90 3/30/2018     Impaired fasting glucose 2012     Past Surgical History:   Procedure Laterality Date      SECTION        SECTION  1967     Cancer History:   As above    Allergies:  Allergies as of 05/10/2019 - Reviewed 05/10/2019   Allergen Reaction Noted     Antihistamine [altaryl]  2012      Lisinopril  09/21/2012     Simvastatin  09/21/2012     Amoxicillin Rash 06/27/2012     Penicillins Rash 06/27/2012     Current Medications:  Current Outpatient Medications   Medication Sig Dispense Refill     albuterol (PROAIR HFA, PROVENTIL HFA, VENTOLIN HFA) 108 (90 BASE) MCG/ACT inhaler Inhale 2 puffs into the lungs every 6 hours as needed for shortness of breath / dyspnea or wheezing 1 Inhaler 1     Cholecalciferol (VITAMIN D-3 PO) Take by mouth daily       clotrimazole (LOTRIMIN) 1 % external cream Apply topically 2 times daily 60 g 0     COD LIVER OIL PO Take  by mouth.       fluticasone (FLONASE) 50 MCG/ACT nasal spray SHAKE LIQUID AND USE 2 SPRAYS IN EACH NOSTRIL DAILY AS NEEDED FOR RHINITIS OR ALLERGIES 48 mL 2     gabapentin (NEURONTIN) 100 MG capsule Take 1 capsule (100 mg) by mouth At Bedtime 30 capsule 0     hydrochlorothiazide (HYDRODIURIL) 25 MG tablet TAKE 1 TABLET(25 MG) BY MOUTH DAILY 90 tablet 1     MAGNESIUM OXIDE PO        metoprolol succinate ER (TOPROL-XL) 50 MG 24 hr tablet TAKE 1 TABLET(50 MG) BY MOUTH DAILY 90 tablet 1     Multiple Vitamin (DAILY MULTIVITAMIN PO) Take  by mouth daily.       VITAMIN E daily       aspirin 81 MG chewable tablet Take 1 tablet (81 mg) by mouth daily (Patient not taking: Reported on 4/24/2019) 108 tablet 3      Family History:  History reviewed. No pertinent family history.     Mom had ovarian cancer at 90  Both Sisters have kidney cancer. One had it at 71 and other had it at 81.  Bother had prostate cancer. She is not sure of the age  She has 3 children and all are healthy  Social History:  Social History     Socioeconomic History     Marital status:      Spouse name: Not on file     Number of children: Not on file     Years of education: Not on file     Highest education level: Not on file   Occupational History     Not on file   Social Needs     Financial resource strain: Not on file     Food insecurity:     Worry: Not on file     Inability: Not on file      Transportation needs:     Medical: Not on file     Non-medical: Not on file   Tobacco Use     Smoking status: Never Smoker     Smokeless tobacco: Never Used   Substance and Sexual Activity     Alcohol use: No     Drug use: No     Sexual activity: Yes   Lifestyle     Physical activity:     Days per week: Not on file     Minutes per session: Not on file     Stress: Not on file   Relationships     Social connections:     Talks on phone: Not on file     Gets together: Not on file     Attends Restorationism service: Not on file     Active member of club or organization: Not on file     Attends meetings of clubs or organizations: Not on file     Relationship status: Not on file     Intimate partner violence:     Fear of current or ex partner: Not on file     Emotionally abused: Not on file     Physically abused: Not on file     Forced sexual activity: Not on file   Other Topics Concern     Parent/sibling w/ CABG, MI or angioplasty before 65F 55M? Not Asked   Social History Narrative     Not on file   no smoking. No etoh. Lives with .  Physical Exam:  /73   Pulse 94   Temp 98.2  F (36.8  C) (Oral)   SpO2 97%    Wt Readings from Last 4 Encounters:   05/03/19 76.9 kg (169 lb 8 oz)   05/01/19 75.3 kg (166 lb)   04/24/19 75.3 kg (166 lb)   04/17/19 77.6 kg (171 lb)     CONSTITUTIONAL: No apparent distress  EYES: There is pallor but no jaundice  ENT/MOUTH: Ears unremarkable. No oral lesions  CVS: s1s2 normal  RESPIRATORY: Decreased breath sounds at the right lung base.  GI: Abdomen is benign  NEURO: Alert and oriented ×3  INTEGUMENT: no concerning skin rashes   LYMPHATIC: no palpable lymphadenopathy  MUSCULOSKELETAL: Unremarkable. No bony tenderness.   EXTREMITIES: The groin wound has healed well.  There is mild redness but it does not look actively infected.  Left leg is swollen as before.  There is mild edema on the right side.  PSYCH: Mentation, mood and affect are appropriate      Laboratory/Imaging  Studies      CT ABDOMEN AND PELVIS WITH CONTRAST 3/25/2019 4:15 PM     TECHNIQUE: Images from diaphragm to pubic symphysis 84 mL Isovue-370.  Radiation dose for this scan was reduced using automated exposure  control, adjustment of the mA and/or kV according to patient size, or  iterative reconstruction technique.     HISTORY: Abdominal mass, nonpulsatile, palpable. Left groin mass,  possible cyst or abscess. Infected sebaceous cyst.     COMPARISON: 3/21/2019 ultrasound with 8.9 cm complex cyst left groin.     FINDINGS:  There is a multiloculated left inguinal complex cystic mass  11.7 cm transverse, 7.5 cm AP and 8.2 cm craniocaudal dimension. This  has a  predominantly cystic but complex lateral component and is more  complex with more solid components medially. A 3.5 cm solid mass abuts  the medial portion of this lesion, either a part of this lesion versus  adjacent inguinal adenopathy. Numerous septations and enhancing solid  components within this inguinal mass as well as inguinal, left  external iliac adenopathy. 1.9 x 2 cm adenopathy adjacent to the  proximal left common iliac artery on image 41. Left external iliac  adenopathy is 7 cm AP dimension, series 4 image 6, with the anterior  portion of this adenopathy 3 cm transverse dimension.     Multiple pulmonary nodules at the lung bases are indeterminate but  concerning for metastatic disease with a representative 1.2 cm left  lower lobe nodule, series 2 image 11. No aggressive bone lesions.  Normal-appearing liver, gallbladder, pancreas and adrenal glands.  Bilateral peripelvic renal cysts, larger on the left than the right.  No acute-appearing bowel abnormality. Mild sigmoid diverticulosis. No  free fluid. Absent uterus. Soft tissue nodule in the subcutaneous fat  of the lower left breast/chest wall on series 2 image 10, 1.9 cm in  size, is nonspecific but concerning for malignancy.                                                                       IMPRESSION:  1. Very complex cystic mass in the left inguinal region with  associated inguinal, external iliac and superior pelvic adenopathy.  This is of indeterminate etiology highly concerning for malignancy.  There is a solid 3 cm nodule which is either part of or immediately  adjacent to this mass in the medial left inguinal region, series 4  image 19, which would be amenable to percutaneous biopsy.  2. Inferior left breast nodule concerning for neoplasm with further  evaluation needed.  3. Multiple lung nodules are also concerning for metastatic disease.    [facility]  FINAL DIAGNOSIS:   CASE FROM [place], [place] ([id number], OBTAINED   04/05/2019)   A. SKIN AND SOFT TISSUE, LEFT GROIN MASS, WIDE LOCAL EXCISION:   - SQUAMOUS CELL CARCINOMA, invasive keratinizing type, moderately to   poorly differentiated, extending to inked   deep margin.   - P16 immunostain is strongly positive in approx. 35% of the neoplastic   cells (performed at the referring   institution).     B. LEFT BREAST, INFERIOR, NEEDLE CORE BIOPSY:   - Small fragment of skeletal muscle and fibroadipose tissue with   granulation tissue and changes suggestive of   seroma/hematoma.   - Negative for malignancy.     I have personally reviewed all specimens and/or slides, including the   listed special stains, and used them   with my medical judgement to determine or confirm the final diagnosis.     Electronically signed out by:     Angel Barraza M.D., [facility]     CLINICAL HISTORY:   84-year-old female with left groin mass, metastatic squamous cell   carcinoma     GROSS:   Received from [facility] in [place], [place] are 15 stained   slides and 1 block (A2) labeled [id number]   (obtained 04/05/2019) and a copy of the referring pathologist's report   with patient identifying information.   All slides are returned.     MICROSCOPIC:   Microscopic examination was performed.  P40 immunostain is diffusely and   strongly positive in the neoplastic   cells, confirming the morphologic impression and supporting the   interpretation. Best block for ancillary   studies is [id number].     RESULT FOR IMMUNOHISTOCHEMICAL VENTANA CLONE  PD-L1 ASSAY   TUMOR PROPORTION SCORE (TPS):  approx. 3-5%   INTERPRETATION: LOW PD-L1 EXPRESSION (TPS >/=1-49%)       PATHOLOGY 4/5/19  A. Skin and soft tissue, left groin mass, wide local excision-Squamous cell carcinoma with involvement of deep margin, favor metastatic (see comment).  P16 immunostain is positive.    The left groin mass is represented by squamous cell carcinoma with extensive necrosis. There is no demonstrable connection to the overlying skin and the architecture is indicative of a metastatic process.       B. Left breast, inferior, needle core biopsy-Fat necrosis and hemorrhage consistent with hematoma/cyst. Negative for malignancy.    ASSESSMENT/PLAN:    She has metastatic squamous cell cancer.  P 16 is positive.  She has bilateral pulmonary nodules as well as pelvic lymphadenopathy in the left inguinal mass which was biopsied.  I do not know the exact origin of the cancer at this time.  PDL 1 is low expression TPS 3 to 5%.    We discussed the situation in detail about her incurable illness.  Is because of her extremely poor performance status I do not recommend chemotherapy.  As the PDL 1 is low expression, it is unlikely that she would benefit from immunotherapy.  We did discuss that we can try Keytruda every 3 weeks and I discussed the rationale and potential side effects.    The other option would be to make sure that she remains comfortable and enrolled in hospice.  After thorough discussion she tells me that she does not want to try immunotherapy and she would prefer enrolling in hospice and I believe it is a reasonable decision.  We will give her a referral for hospice.    Poor appetite.  She is asking if she anything could be given  to boost her appetite and energy level.  I recommend starting dexamethasone 4 mg daily in the morning to see if it helps.    Cough and shortness of breath.  This is basically due to the cancer risks.  In the lungs and pleural effusion.  She is taking Robitussin which is helping with the cough and she will continue to do that.  Currently she does not require oxygen but we discussed that if need be then it could be provided through hospice.    Anemia.  She has anemia of chronic disease.    Leukocytosis and thrombocytosis.  These are likely reactive.      Left lower extremity swelling.  There is no DVT.  Continue to try to keep her legs elevated when she is lying down or sitting.      We again discussed that she should have advanced care planning and she is working on finalizing those documents.      End of life care.  We discussed about her wishes at the end of life and we discussed her CODE STATUS.  She wants to be DNR/DNI.  We will get POLST form signed today.  I told her to stick a copy to the refrigerator so that in case of an emergency everybody should know her wishes and not perform CPR against her wishes.    I wished her safe and comfortable to stay in hospice.  She knows to contact me if she has any questions or concerns    All of her and her daughter's questions were answered to their satisfaction.  They are agreeable and comfortable with the plan.    Nereyda Pham            Again, thank you for allowing me to participate in the care of your patient.        Sincerely,        Nereyda Pham MD

## 2019-05-10 NOTE — PROGRESS NOTES
Reviewed POLST form with patient and daughter. Form filled out. Patient and Dr Pham signed form. Original document provided to patient, copy labeled and sent to EMR. Provided [facility] hand-outs to patient.  Instructed [facility] will contact patient within 24-48 hrs. Patient and daughter deny further questions. Encouraged to call with any questions that may arise or concerns.  Arabella Judd  RN, BSN, OCN

## 2019-05-10 NOTE — NURSING NOTE
"Oncology Rooming Note    May 10, 2019 10:26 AM   Flores Cuello is a 84 year old female who presents for:    Chief Complaint   Patient presents with     Oncology Clinic Visit     Return for 1 wk f/u     Initial Vitals: Pulse 94   Temp 98.2  F (36.8  C) (Oral)   SpO2 97%  Estimated body mass index is 33.94 kg/m  as calculated from the following:    Height as of 5/3/19: 1.505 m (4' 11.25\").    Weight as of 5/3/19: 76.9 kg (169 lb 8 oz). There is no height or weight on file to calculate BSA.  No Pain (0) Comment: Data Unavailable   No LMP recorded. Patient is postmenopausal.  Allergies reviewed: Yes  Medications reviewed: Yes    Medications: Medication refills not needed today.  Pharmacy name entered into C2FO: [facility] [address] [place] [address] [id number] AT [facility] & [place]    Clinical concerns: decreased energy, increased SOB, decreased appetite since last week Dr. Pham was notified.      Maury Almeida RN              "

## 2019-05-15 ENCOUNTER — DOCUMENTATION ONLY (OUTPATIENT)
Dept: OTHER | Facility: CLINIC | Age: 84
End: 2019-05-15

## 2019-05-22 ENCOUNTER — DOCUMENTATION ONLY (OUTPATIENT)
Dept: FAMILY MEDICINE | Facility: CLINIC | Age: 84
End: 2019-05-22

## 2019-05-22 NOTE — PROGRESS NOTES
Dear Associated Docs:    We are notifying you of a Missed Visit or Hospice Death.    Flores Cuello; MRN [id number]   peacefully On date May 21st, 2019  Time 11:00 PM  Sincerely [facility]  Karey Ivory  [phone]

## 2019-05-22 NOTE — PROGRESS NOTES
Dear Associated Docs:    We are notifying you of a Hospice Death.    Flores Cuello; MRN [id number]   peacefully On date 2019  Time 9:39 PM  Sincerely [facility]  Karey Ivory  [phone]

## 2019-05-28 ENCOUNTER — TELEPHONE (OUTPATIENT)
Dept: FAMILY MEDICINE | Facility: CLINIC | Age: 84
End: 2019-05-28

## 2019-05-28 NOTE — TELEPHONE ENCOUNTER
Called and spoke to the  home. They will refax the form to me and I will give it to Dr Prado.Ally Merlos MA/DAWIT

## 2019-05-28 NOTE — TELEPHONE ENCOUNTER
Dat called back.  States has not received the form for cremation back yet from Dr. Remy Prado.  I let Dat know that Dr. Remy Prado has never seen this patient;  She is followed by oncology and Emery Kuhn PA-C.  He states Dr. Remy Prado had okayed the home care orders.  I explained that Emery Kuhn PA-C is a PA-C and not able to sign home care orders;  Dr. Remy Prado does these for him.  Emery Kuhn PA-C is able to complete the cremation forms.  I did advise he send these forms to him.  Dr. Remy Prado has not been in the office since 5/22. Is in clinic today.  He states that it is over the 6 day limit already.  He will forward the email to Emery Kuhn PA-C for him to complete.  I did contact Emrey Kuhn PA-C via text and he said would complete them.

## 2019-05-28 NOTE — TELEPHONE ENCOUNTER
Emery Kuhn PA-C completed the form.  I called Dat at [facility].  He checked state site and it does states it's been filed.  Vale Ortega RN

## 2019-05-28 NOTE — TELEPHONE ENCOUNTER
Dat is calling back stating that they are not allowed to fax these forms any longer.  Please see your email.  Please call with questions.  Thank you

## 2019-05-28 NOTE — TELEPHONE ENCOUNTER
[facility] is calling stating on  sent clinic/provider cremation forms to fill out and has yet received anything back, please call back for status. Thank you.

## 2019-07-27 DIAGNOSIS — I10 HYPERTENSION GOAL BP (BLOOD PRESSURE) < 150/90: ICD-10-CM

## 2019-07-29 RX ORDER — METOPROLOL SUCCINATE 50 MG/1
TABLET, EXTENDED RELEASE ORAL
Qty: 90 TABLET | Refills: 2 | Status: SHIPPED | OUTPATIENT
Start: 2019-07-29

## 2020-02-24 ENCOUNTER — HEALTH MAINTENANCE LETTER (OUTPATIENT)
Age: 85
End: 2020-02-24

## 2020-12-13 ENCOUNTER — HEALTH MAINTENANCE LETTER (OUTPATIENT)
Age: 85
End: 2020-12-13

## 2021-04-17 ENCOUNTER — HEALTH MAINTENANCE LETTER (OUTPATIENT)
Age: 86
End: 2021-04-17

## 2021-08-01 ENCOUNTER — HEALTH MAINTENANCE LETTER (OUTPATIENT)
Age: 86
End: 2021-08-01

## 2021-09-26 ENCOUNTER — HEALTH MAINTENANCE LETTER (OUTPATIENT)
Age: 86
End: 2021-09-26

## 2022-02-17 PROBLEM — Z71.89 ADVANCE DIRECTIVE DISCUSSED WITH PATIENT: Status: RESOLVED | Noted: 2018-03-14 | Resolved: 2019-05-15

## 2022-03-13 ENCOUNTER — HEALTH MAINTENANCE LETTER (OUTPATIENT)
Age: 87
End: 2022-03-13

## 2022-05-08 ENCOUNTER — HEALTH MAINTENANCE LETTER (OUTPATIENT)
Age: 87
End: 2022-05-08

## 2022-10-04 PROBLEM — C79.9 SECONDARY MALIGNANT NEOPLASM OF UNSPECIFIED SITE (CODE) (H): Status: ACTIVE | Noted: 2019-04-24

## 2023-04-23 ENCOUNTER — HEALTH MAINTENANCE LETTER (OUTPATIENT)
Age: 88
End: 2023-04-23

## 2023-06-02 ENCOUNTER — HEALTH MAINTENANCE LETTER (OUTPATIENT)
Age: 88
End: 2023-06-02

## 2023-12-03 ENCOUNTER — HEALTH MAINTENANCE LETTER (OUTPATIENT)
Age: 88
End: 2023-12-03